# Patient Record
Sex: FEMALE | Race: WHITE | Employment: FULL TIME | URBAN - METROPOLITAN AREA
[De-identification: names, ages, dates, MRNs, and addresses within clinical notes are randomized per-mention and may not be internally consistent; named-entity substitution may affect disease eponyms.]

---

## 2017-08-10 ENCOUNTER — APPOINTMENT (OUTPATIENT)
Dept: ULTRASOUND IMAGING | Facility: HOSPITAL | Age: 57
DRG: 287 | End: 2017-08-10
Attending: INTERNAL MEDICINE
Payer: COMMERCIAL

## 2017-08-10 ENCOUNTER — HOSPITAL ENCOUNTER (INPATIENT)
Facility: HOSPITAL | Age: 57
LOS: 1 days | Discharge: HOME | DRG: 287 | End: 2017-08-11
Attending: INTERNAL MEDICINE | Admitting: INTERNAL MEDICINE
Payer: COMMERCIAL

## 2017-08-10 LAB
ALBUMIN SERPL-MCNC: 3.7 G/DL (ref 3.5–4.8)
ALP LIVER SERPL-CCNC: 69 U/L (ref 46–118)
ALT SERPL-CCNC: 33 U/L (ref 14–54)
APTT PPP: 33.3 SECONDS (ref 25–34)
AST SERPL-CCNC: 22 U/L (ref 15–41)
BASOPHILS # BLD AUTO: 0.03 X10(3) UL (ref 0–0.1)
BASOPHILS NFR BLD AUTO: 0.7 %
BETA NATRIURETIC PEPTIDE: 13 PG/ML (ref 2–99)
BILIRUB SERPL-MCNC: 0.4 MG/DL (ref 0.1–2)
BUN BLD-MCNC: 10 MG/DL (ref 8–20)
CALCIUM BLD-MCNC: 9.3 MG/DL (ref 8.3–10.3)
CHLORIDE: 106 MMOL/L (ref 101–111)
CK: 251 IU/L (ref 26–192)
CKMB: 4.3 NG/ML (ref 0–5)
CO2: 26 MMOL/L (ref 22–32)
CREAT BLD-MCNC: 0.81 MG/DL (ref 0.55–1.02)
EOSINOPHIL # BLD AUTO: 0.15 X10(3) UL (ref 0–0.3)
EOSINOPHIL NFR BLD AUTO: 3.6 %
ERYTHROCYTE [DISTWIDTH] IN BLOOD BY AUTOMATED COUNT: 13.5 % (ref 11.5–16)
GLUCOSE BLD-MCNC: 92 MG/DL (ref 70–99)
HAV IGM SER QL: 2.3 MG/DL (ref 1.7–3)
HCT VFR BLD AUTO: 38.3 % (ref 34–50)
HGB BLD-MCNC: 12.6 G/DL (ref 12–16)
IMMATURE GRANULOCYTE COUNT: 0.01 X10(3) UL (ref 0–1)
IMMATURE GRANULOCYTE RATIO %: 0.2 %
INR BLD: 1.02 (ref 0.89–1.11)
LYMPHOCYTES # BLD AUTO: 1.42 X10(3) UL (ref 0.9–4)
LYMPHOCYTES NFR BLD AUTO: 34.3 %
M PROTEIN MFR SERPL ELPH: 7.1 G/DL (ref 6.1–8.3)
MB INDEX: 2 % (ref ?–4)
MCH RBC QN AUTO: 28.6 PG (ref 27–33.2)
MCHC RBC AUTO-ENTMCNC: 32.9 G/DL (ref 31–37)
MCV RBC AUTO: 87 FL (ref 81–100)
MONOCYTES # BLD AUTO: 0.46 X10(3) UL (ref 0.1–0.6)
MONOCYTES NFR BLD AUTO: 11.1 %
NEUTROPHIL ABS PRELIM: 2.07 X10 (3) UL (ref 1.3–6.7)
NEUTROPHILS # BLD AUTO: 2.07 X10(3) UL (ref 1.3–6.7)
NEUTROPHILS NFR BLD AUTO: 50.1 %
PLATELET # BLD AUTO: 224 10(3)UL (ref 150–450)
POTASSIUM SERPL-SCNC: 3.9 MMOL/L (ref 3.6–5.1)
PSA SERPL DL<=0.01 NG/ML-MCNC: 13.4 SECONDS (ref 12–14.3)
RBC # BLD AUTO: 4.4 X10(6)UL (ref 3.8–5.1)
RED CELL DISTRIBUTION WIDTH-SD: 43.3 FL (ref 35.1–46.3)
SED RATE-ML: 10 MM/HR (ref 0–25)
SODIUM SERPL-SCNC: 139 MMOL/L (ref 136–144)
TROPONIN: <0.046 NG/ML (ref ?–0.05)
TSI SER-ACNC: 1.33 MIU/ML (ref 0.35–5.5)
WBC # BLD AUTO: 4.1 X10(3) UL (ref 4–13)

## 2017-08-10 PROCEDURE — 93923 UPR/LXTR ART STDY 3+ LVLS: CPT | Performed by: INTERNAL MEDICINE

## 2017-08-10 PROCEDURE — 99253 IP/OBS CNSLTJ NEW/EST LOW 45: CPT | Performed by: HOSPITALIST

## 2017-08-10 PROCEDURE — 93880 EXTRACRANIAL BILAT STUDY: CPT | Performed by: INTERNAL MEDICINE

## 2017-08-10 RX ORDER — DIPHENHYDRAMINE HYDROCHLORIDE 25 MG/1
1 TABLET ORAL DAILY
COMMUNITY

## 2017-08-10 RX ORDER — CLONAZEPAM 0.5 MG/1
1 TABLET ORAL NIGHTLY
Status: DISCONTINUED | OUTPATIENT
Start: 2017-08-10 | End: 2017-08-12

## 2017-08-10 RX ORDER — ASPIRIN 81 MG/1
324 TABLET, CHEWABLE ORAL ONCE
Status: DISCONTINUED | OUTPATIENT
Start: 2017-08-11 | End: 2017-08-11

## 2017-08-10 RX ORDER — MELATONIN
2500 DAILY
COMMUNITY

## 2017-08-10 RX ORDER — HEPARIN SODIUM 5000 [USP'U]/ML
5000 INJECTION, SOLUTION INTRAVENOUS; SUBCUTANEOUS EVERY 8 HOURS SCHEDULED
Status: DISCONTINUED | OUTPATIENT
Start: 2017-08-10 | End: 2017-08-12

## 2017-08-10 RX ORDER — SODIUM CHLORIDE 9 MG/ML
INJECTION, SOLUTION INTRAVENOUS CONTINUOUS
Status: DISCONTINUED | OUTPATIENT
Start: 2017-08-11 | End: 2017-08-11

## 2017-08-10 RX ORDER — DULOXETIN HYDROCHLORIDE 30 MG/1
120 CAPSULE, DELAYED RELEASE ORAL DAILY
Status: DISCONTINUED | OUTPATIENT
Start: 2017-08-11 | End: 2017-08-12

## 2017-08-10 RX ORDER — CARVEDILOL 3.12 MG/1
3.12 TABLET ORAL 2 TIMES DAILY WITH MEALS
Status: DISCONTINUED | OUTPATIENT
Start: 2017-08-10 | End: 2017-08-12

## 2017-08-10 RX ORDER — RANITIDINE 150 MG/1
150 CAPSULE ORAL DAILY
COMMUNITY
End: 2018-11-06 | Stop reason: ALTCHOICE

## 2017-08-10 RX ORDER — SODIUM CHLORIDE 9 MG/ML
INJECTION, SOLUTION INTRAVENOUS CONTINUOUS
Status: DISCONTINUED | OUTPATIENT
Start: 2017-08-10 | End: 2017-08-10

## 2017-08-10 RX ORDER — SODIUM CHLORIDE 9 MG/ML
INJECTION, SOLUTION INTRAVENOUS CONTINUOUS
Status: ACTIVE | OUTPATIENT
Start: 2017-08-10 | End: 2017-08-10

## 2017-08-10 RX ORDER — CLONAZEPAM 0.5 MG/1
0.25 TABLET ORAL DAILY
COMMUNITY

## 2017-08-10 RX ORDER — MELATONIN
400 DAILY
COMMUNITY

## 2017-08-10 RX ORDER — CLONAZEPAM 0.5 MG/1
0.5 TABLET ORAL DAILY
Status: DISCONTINUED | OUTPATIENT
Start: 2017-08-11 | End: 2017-08-12

## 2017-08-10 RX ORDER — RIBOFLAVIN (VITAMIN B2) 100 MG
250 TABLET ORAL DAILY
COMMUNITY

## 2017-08-10 RX ORDER — FAMOTIDINE 20 MG/1
40 TABLET ORAL DAILY
Status: DISCONTINUED | OUTPATIENT
Start: 2017-08-10 | End: 2017-08-12

## 2017-08-10 RX ORDER — TOPIRAMATE 100 MG/1
200 TABLET, FILM COATED ORAL NIGHTLY
Status: DISCONTINUED | OUTPATIENT
Start: 2017-08-10 | End: 2017-08-12

## 2017-08-10 RX ORDER — ASPIRIN 81 MG/1
324 TABLET, CHEWABLE ORAL ONCE
Status: DISCONTINUED | OUTPATIENT
Start: 2017-08-10 | End: 2017-08-10

## 2017-08-10 RX ORDER — CLONAZEPAM 1 MG/1
0.75 TABLET ORAL NIGHTLY
COMMUNITY

## 2017-08-10 NOTE — H&P
Please refer to office note from today. We are admitting Ms. Bond for progressive exertional chest pain with radiation to left shoulder , dyspnea and claudication in the setting of an abnormal stress echo with exercise induced wall motion abnormalities

## 2017-08-10 NOTE — CONSULTS
[address] Patient Status:  Inpatient    1960 MRN [id number]   [facility] 8NE-A Attending Sergio Russell MD   Hosp Day # 0 PCP No primary care provider on file.      Reason for consult: med mgt    Requ • Ovarian cyst     ovarian cysts   • Ovarian cystic mass    • Peptic ulcer disease    • PTSD (post-traumatic stress disorder)    • Pulmonary embolism (HCC)    • Raynaud's syndrome    • Sebaceous hyperplasia 12/1/2008   • Skin papules, generalized 12/1/2 nightly. For migraines  Disp:  Rfl:    Probiotic Product (PROBIOTIC OR) Take 1 tablet by mouth 2 (two) times daily.    Disp:  Rfl:    MAGNESIUM OR Take 2 tabs daily Disp:  Rfl:    cycloSPORINE (RESTASIS) 0.05 % Ophthalmic Emulsion 1 drop 2 (two) times daily 139   K  3.9   CL  106   CO2  26.0   ALKPHO  69   AST  22   ALT  33   BILT  0.4   TP  7.1       Recent Labs   Lab  08/10/17   1017   PTP  13.4   INR  1.02       Recent Labs   Lab  08/10/17   1017   TROP  <0.046   CK  251*       Imaging: Imaging data review

## 2017-08-10 NOTE — H&P
Liana Boston  : 1960  ACCOUNT:  [id number]  [phone]  PCP: Dr. Nataly Metcalf     TODAY'S DATE: 08/10/2017  DICTATED BY:  [00]    CHIEF COMPLAINT: Moreland Haritha pain.]    HPI:    [On 08/10/2017, Gume Valle, a 64year old female, presented with chest pa person and pressured speech. CV: PALP: PMI not displaced, no lifts and thrills or rub. AUSC:  regular rhythm, normal S1, S2 without S3; 1/6 systolic ejection murmur radiating to the left axilla.  CAROTIDS: carotid pulses normal. ABD AORTA: abdominal ao 03/09/10 Flonase               50MCG/AC  twice daily                              03/09/10 KlonoPIN              0.5MG     1 tablet three times daily               03/09/10 Lidoderm              5%        as needed                                01/29/

## 2017-08-11 ENCOUNTER — APPOINTMENT (OUTPATIENT)
Dept: INTERVENTIONAL RADIOLOGY/VASCULAR | Facility: HOSPITAL | Age: 57
DRG: 287 | End: 2017-08-11
Attending: INTERNAL MEDICINE
Payer: COMMERCIAL

## 2017-08-11 ENCOUNTER — APPOINTMENT (OUTPATIENT)
Dept: CV DIAGNOSTICS | Facility: HOSPITAL | Age: 57
DRG: 287 | End: 2017-08-11
Attending: INTERNAL MEDICINE
Payer: COMMERCIAL

## 2017-08-11 VITALS
DIASTOLIC BLOOD PRESSURE: 61 MMHG | BODY MASS INDEX: 22 KG/M2 | SYSTOLIC BLOOD PRESSURE: 100 MMHG | RESPIRATION RATE: 18 BRPM | HEART RATE: 67 BPM | TEMPERATURE: 98 F | OXYGEN SATURATION: 98 % | WEIGHT: 131.19 LBS

## 2017-08-11 PROBLEM — R07.9 CHEST PAIN: Status: ACTIVE | Noted: 2017-08-11

## 2017-08-11 LAB
ATRIAL RATE: 69 BPM
BASOPHILS # BLD AUTO: 0.02 X10(3) UL (ref 0–0.1)
BASOPHILS NFR BLD AUTO: 0.6 %
BUN BLD-MCNC: 11 MG/DL (ref 8–20)
CALCIUM BLD-MCNC: 8.8 MG/DL (ref 8.3–10.3)
CHLORIDE: 112 MMOL/L (ref 101–111)
CHOLEST SMN-MCNC: 229 MG/DL (ref ?–200)
CK: 182 IU/L (ref 26–192)
CO2: 23 MMOL/L (ref 22–32)
CREAT BLD-MCNC: 0.85 MG/DL (ref 0.55–1.02)
EOSINOPHIL # BLD AUTO: 0.13 X10(3) UL (ref 0–0.3)
EOSINOPHIL NFR BLD AUTO: 3.8 %
ERYTHROCYTE [DISTWIDTH] IN BLOOD BY AUTOMATED COUNT: 13.8 % (ref 11.5–16)
GLUCOSE BLD-MCNC: 103 MG/DL (ref 70–99)
HCT VFR BLD AUTO: 36.2 % (ref 34–50)
HDLC SERPL-MCNC: 49 MG/DL (ref 45–?)
HDLC SERPL: 4.67 {RATIO} (ref ?–4.44)
HGB BLD-MCNC: 11.9 G/DL (ref 12–16)
IMMATURE GRANULOCYTE COUNT: 0 X10(3) UL (ref 0–1)
IMMATURE GRANULOCYTE RATIO %: 0 %
LDLC SERPL CALC-MCNC: 147 MG/DL (ref ?–130)
LDLC SERPL-MCNC: 33 MG/DL (ref 5–40)
LYMPHOCYTES # BLD AUTO: 1.64 X10(3) UL (ref 0.9–4)
LYMPHOCYTES NFR BLD AUTO: 48.4 %
MCH RBC QN AUTO: 28.7 PG (ref 27–33.2)
MCHC RBC AUTO-ENTMCNC: 32.9 G/DL (ref 31–37)
MCV RBC AUTO: 87.2 FL (ref 81–100)
MONOCYTES # BLD AUTO: 0.36 X10(3) UL (ref 0.1–0.6)
MONOCYTES NFR BLD AUTO: 10.6 %
NEUTROPHIL ABS PRELIM: 1.24 X10 (3) UL (ref 1.3–6.7)
NEUTROPHILS # BLD AUTO: 1.24 X10(3) UL (ref 1.3–6.7)
NEUTROPHILS NFR BLD AUTO: 36.6 %
NONHDLC SERPL-MCNC: 180 MG/DL (ref ?–130)
P AXIS: 76 DEGREES
P-R INTERVAL: 164 MS
PLATELET # BLD AUTO: 208 10(3)UL (ref 150–450)
POTASSIUM SERPL-SCNC: 3.8 MMOL/L (ref 3.6–5.1)
Q-T INTERVAL: 440 MS
QRS DURATION: 84 MS
QTC CALCULATION (BEZET): 471 MS
R AXIS: 73 DEGREES
RBC # BLD AUTO: 4.15 X10(6)UL (ref 3.8–5.1)
RED CELL DISTRIBUTION WIDTH-SD: 43.9 FL (ref 35.1–46.3)
SODIUM SERPL-SCNC: 143 MMOL/L (ref 136–144)
T AXIS: 61 DEGREES
TRIGLYCERIDES: 164 MG/DL (ref ?–150)
TROPONIN: <0.046 NG/ML (ref ?–0.05)
VENTRICULAR RATE: 69 BPM
WBC # BLD AUTO: 3.4 X10(3) UL (ref 4–13)

## 2017-08-11 PROCEDURE — 93306 TTE W/DOPPLER COMPLETE: CPT | Performed by: INTERNAL MEDICINE

## 2017-08-11 PROCEDURE — 4A023N7 MEASUREMENT OF CARDIAC SAMPLING AND PRESSURE, LEFT HEART, PERCUTANEOUS APPROACH: ICD-10-PCS | Performed by: INTERNAL MEDICINE

## 2017-08-11 PROCEDURE — B2111ZZ FLUOROSCOPY OF MULTIPLE CORONARY ARTERIES USING LOW OSMOLAR CONTRAST: ICD-10-PCS | Performed by: INTERNAL MEDICINE

## 2017-08-11 PROCEDURE — 99232 SBSQ HOSP IP/OBS MODERATE 35: CPT | Performed by: HOSPITALIST

## 2017-08-11 RX ORDER — HEPARIN SODIUM 5000 [USP'U]/ML
INJECTION, SOLUTION INTRAVENOUS; SUBCUTANEOUS
Status: COMPLETED
Start: 2017-08-11 | End: 2017-08-11

## 2017-08-11 RX ORDER — MIDAZOLAM HYDROCHLORIDE 1 MG/ML
INJECTION INTRAMUSCULAR; INTRAVENOUS
Status: DISCONTINUED
Start: 2017-08-11 | End: 2017-08-11 | Stop reason: WASHOUT

## 2017-08-11 RX ORDER — SODIUM CHLORIDE 9 MG/ML
INJECTION, SOLUTION INTRAVENOUS CONTINUOUS
Status: ACTIVE | OUTPATIENT
Start: 2017-08-11 | End: 2017-08-11

## 2017-08-11 RX ORDER — LIDOCAINE HYDROCHLORIDE 10 MG/ML
INJECTION, SOLUTION INFILTRATION; PERINEURAL
Status: COMPLETED
Start: 2017-08-11 | End: 2017-08-11

## 2017-08-11 RX ORDER — MIDAZOLAM HYDROCHLORIDE 1 MG/ML
INJECTION INTRAMUSCULAR; INTRAVENOUS
Status: COMPLETED
Start: 2017-08-11 | End: 2017-08-11

## 2017-08-11 RX ORDER — SODIUM CHLORIDE 9 MG/ML
INJECTION, SOLUTION INTRAVENOUS CONTINUOUS
Status: DISCONTINUED | OUTPATIENT
Start: 2017-08-11 | End: 2017-08-12

## 2017-08-11 RX ORDER — ASPIRIN 81 MG/1
324 TABLET, CHEWABLE ORAL ONCE
Status: COMPLETED | OUTPATIENT
Start: 2017-08-11 | End: 2017-08-11

## 2017-08-11 RX ORDER — DIPHENHYDRAMINE HYDROCHLORIDE 50 MG/ML
INJECTION INTRAMUSCULAR; INTRAVENOUS
Status: COMPLETED
Start: 2017-08-11 | End: 2017-08-11

## 2017-08-11 RX ORDER — ASPIRIN 81 MG/1
TABLET, CHEWABLE ORAL
Status: DISCONTINUED
Start: 2017-08-11 | End: 2017-08-12

## 2017-08-11 RX ORDER — VERAPAMIL HYDROCHLORIDE 2.5 MG/ML
INJECTION, SOLUTION INTRAVENOUS
Status: COMPLETED
Start: 2017-08-11 | End: 2017-08-11

## 2017-08-11 NOTE — PROGRESS NOTES
Discussed with Dr. Eris Diop. Reviewed history and procedure with Eugenio Rodriguez. Discussed R/B/A - including no intervention -- appears to have a good understanding. Prelim cath    EDP 10    Normal coronary arteries.     TR band applied to right RA    Home tod

## 2017-08-11 NOTE — PROCEDURES
[facility]    PATIENT'S NAME: Martha De La Garza   ATTENDING PHYSICIAN: Mere Day. Hilary Ward M.D. OPERATING PHYSICIAN: Christopher Harris M.D.    PATIENT ACCOUNT#:   [de-identified]    LOCATION:  [address] [place] [address]  MEDICAL RECORD #:   [id number]       DATE OF BIRTH:  [birth year] end-diastolic pressure was 10 mmHg. CORONARY ARTERIOGRAPHY:  Coronary circulation was right dominant and no significant vascular calcification was seen fluoroscopically.   The coronary arteries were angiographically normal.    Dictated By Glendy Roe

## 2017-08-11 NOTE — PROGRESS NOTES
[address] Patient Status:  Inpatient    1960 MRN [id number]   [facility] 8NE-A Attending Antonella Polo MD   Hosp Day # 1 PCP No primary care provider on file.      Reason for consult: med mgt    Requ Anxiety-resume home meds  5.  Hx PE-not on AC any longer    Quality:  · DVT Prophylaxis: scds,heparin  · CODE status: full  · Padilla: no    Plan of care discussed with patient and rn    Kenia Person MD

## 2017-08-11 NOTE — PAYOR COMM NOTE
--------------  ADMISSION REVIEW     Payor: LEXI GARZA  Subscriber #:  [id number]  Authorization Number: N/A    Admit date: 8/10/17  Admit time: 6621       Admitting Physician: Edward Paz MD  Attending Physician:  Edward Paz MD  Primary Care Physicia Dose Route User    8/11/2017 0741 Given 40 mg Oral Leighann Ordonez, RN      Heparin Sodium (Porcine) 5000 UNIT/ML injection 5,000 Units     Date Action Dose Route User    8/10/2017 2107 Given 5000 Units Subcutaneous (Left Lower Abdomen) Ange Anguiano

## 2017-08-12 NOTE — PLAN OF CARE
ALERT AND ORIENTED X4 WITH RIGHT WRIST AYALA C/D/I. NO BLEEDING AND NO HEMATOMA. RADIAL PULSES PRESENT AND PALPABLE. DENIES ANY PAIN. DISCHARGE HOME ACCOMPANIED BY FAMILY MEMBERS WITH DISCHARGE INSTRUCTION GIVEN AND VERBALIZED UNDERSTANDING.

## 2017-08-17 NOTE — PAYOR COMM NOTE
--------------  DISCHARGE REVIEW    Payor: LEXI GARZA  Subscriber #:  [id number]  Authorization Number: N/A    Admit date: 8/10/17  Admit time:  0941  Discharge Date: 8/11/2017  8:25 PM     Admitting Physician: Adrián Queen MD  Attending Physician:  Gina saenz

## 2018-01-03 ENCOUNTER — HOSPITAL ENCOUNTER (OUTPATIENT)
Dept: CV DIAGNOSTICS | Facility: HOSPITAL | Age: 58
Discharge: HOME | End: 2018-01-03
Attending: INTERNAL MEDICINE
Payer: COMMERCIAL

## 2018-01-03 DIAGNOSIS — R00.2 PALPITATIONS: ICD-10-CM

## 2018-01-03 PROCEDURE — 93271 ECG/MONITORING AND ANALYSIS: CPT | Performed by: INTERNAL MEDICINE

## 2018-01-03 PROCEDURE — 93270 REMOTE 30 DAY ECG REV/REPORT: CPT | Performed by: INTERNAL MEDICINE

## 2018-01-03 PROCEDURE — 93272 ECG/REVIEW INTERPRET ONLY: CPT | Performed by: INTERNAL MEDICINE

## 2018-04-25 ENCOUNTER — HOSPITAL ENCOUNTER (OUTPATIENT)
Dept: GENERAL RADIOLOGY | Facility: HOSPITAL | Age: 58
Discharge: HOME | End: 2018-04-25
Attending: INTERNAL MEDICINE
Payer: COMMERCIAL

## 2018-04-25 DIAGNOSIS — R07.89 OTHER CHEST PAIN: ICD-10-CM

## 2018-04-25 PROCEDURE — 71046 X-RAY EXAM CHEST 2 VIEWS: CPT | Performed by: INTERNAL MEDICINE

## 2018-05-10 ENCOUNTER — RT VISIT (OUTPATIENT)
Dept: RESPIRATORY THERAPY | Facility: HOSPITAL | Age: 58
End: 2018-05-10
Attending: INTERNAL MEDICINE
Payer: COMMERCIAL

## 2018-05-10 DIAGNOSIS — R06.00 DYSPNEA: ICD-10-CM

## 2018-05-10 PROCEDURE — 94070 EVALUATION OF WHEEZING: CPT

## 2018-05-15 NOTE — PROCEDURES
Methacholine challenge testing was done. Spirometry at baseline shows no evidence of airway obstruction. After exposure to methacholine  there was no significant decline in the FEV1.    This is a negative  methacholine challenge test and not  indicative

## 2018-06-06 ENCOUNTER — HOSPITAL ENCOUNTER (OUTPATIENT)
Dept: GENERAL RADIOLOGY | Age: 58
Discharge: HOME | End: 2018-06-06
Attending: INTERNAL MEDICINE
Payer: COMMERCIAL

## 2018-06-06 DIAGNOSIS — R91.8 PULMONARY NODULES: ICD-10-CM

## 2018-06-06 PROCEDURE — 71046 X-RAY EXAM CHEST 2 VIEWS: CPT | Performed by: INTERNAL MEDICINE

## 2018-07-02 ENCOUNTER — HOSPITAL ENCOUNTER (OUTPATIENT)
Dept: CT IMAGING | Age: 58
Discharge: HOME | End: 2018-07-02
Attending: INTERNAL MEDICINE
Payer: COMMERCIAL

## 2018-07-02 DIAGNOSIS — R07.81 RIB PAIN ON LEFT SIDE: ICD-10-CM

## 2018-07-02 DIAGNOSIS — R07.9 CHEST PAIN: ICD-10-CM

## 2018-07-02 LAB — CREAT SERPL-MCNC: 0.8 MG/DL (ref 0.55–1.02)

## 2018-07-02 PROCEDURE — 82565 ASSAY OF CREATININE: CPT

## 2018-07-02 PROCEDURE — 71260 CT THORAX DX C+: CPT | Performed by: INTERNAL MEDICINE

## 2018-08-15 ENCOUNTER — HOSPITAL ENCOUNTER (OUTPATIENT)
Dept: CT IMAGING | Age: 58
Discharge: HOME | End: 2018-08-15
Attending: INTERNAL MEDICINE
Payer: COMMERCIAL

## 2018-08-15 DIAGNOSIS — J18.9 PNEUMONIA: ICD-10-CM

## 2018-08-15 PROCEDURE — 71250 CT THORAX DX C-: CPT | Performed by: INTERNAL MEDICINE

## 2018-09-04 ENCOUNTER — RT VISIT (OUTPATIENT)
Dept: RESPIRATORY THERAPY | Facility: HOSPITAL | Age: 58
End: 2018-09-04
Attending: INTERNAL MEDICINE
Payer: COMMERCIAL

## 2018-09-04 DIAGNOSIS — R06.02 SHORTNESS OF BREATH: ICD-10-CM

## 2018-09-05 NOTE — PROCEDURES
Spirometry, flow volume loop, lung volumes are all within normal limits. No evidence of restriction or obstruction.    There is a moderate increase in the RV and mild in  TLC  suggesting  air trapping  and hyperinflation  (   possible due to  intermitten

## 2018-09-17 ENCOUNTER — HOSPITAL ENCOUNTER (OUTPATIENT)
Dept: CT IMAGING | Age: 58
Discharge: HOME | End: 2018-09-17
Attending: INTERNAL MEDICINE
Payer: COMMERCIAL

## 2018-09-17 DIAGNOSIS — R91.8 PULMONARY NODULES/LESIONS, MULTIPLE: ICD-10-CM

## 2018-09-17 PROCEDURE — 71250 CT THORAX DX C-: CPT | Performed by: INTERNAL MEDICINE

## 2018-10-30 ENCOUNTER — HOSPITAL ENCOUNTER (OUTPATIENT)
Dept: NUCLEAR MEDICINE | Facility: HOSPITAL | Age: 58
Discharge: HOME | End: 2018-10-30
Attending: INTERNAL MEDICINE
Payer: COMMERCIAL

## 2018-10-30 DIAGNOSIS — R91.8 PULMONARY NODULES: ICD-10-CM

## 2018-10-30 PROCEDURE — 82962 GLUCOSE BLOOD TEST: CPT

## 2018-10-30 PROCEDURE — 78815 PET IMAGE W/CT SKULL-THIGH: CPT | Performed by: INTERNAL MEDICINE

## 2018-11-06 VITALS — BODY MASS INDEX: 22.16 KG/M2 | HEIGHT: 65 IN | WEIGHT: 133 LBS

## 2018-11-06 RX ORDER — CITALOPRAM 20 MG/1
20 TABLET ORAL DAILY
COMMUNITY

## 2018-11-06 RX ORDER — PANTOPRAZOLE SODIUM 20 MG/1
20 TABLET, DELAYED RELEASE ORAL
COMMUNITY

## 2018-11-13 ENCOUNTER — HOSPITAL ENCOUNTER (OUTPATIENT)
Dept: CT IMAGING | Facility: HOSPITAL | Age: 58
Discharge: HOME | End: 2018-11-13
Attending: INTERNAL MEDICINE
Payer: COMMERCIAL

## 2018-11-19 ENCOUNTER — APPOINTMENT (OUTPATIENT)
Dept: LAB | Facility: HOSPITAL | Age: 58
End: 2018-11-19
Attending: INTERNAL MEDICINE
Payer: COMMERCIAL

## 2018-11-19 ENCOUNTER — HOSPITAL ENCOUNTER (OUTPATIENT)
Dept: GENERAL RADIOLOGY | Facility: HOSPITAL | Age: 58
Discharge: HOME | End: 2018-11-19
Attending: RADIOLOGY
Payer: COMMERCIAL

## 2018-11-19 ENCOUNTER — HOSPITAL ENCOUNTER (OUTPATIENT)
Dept: CT IMAGING | Facility: HOSPITAL | Age: 58
Discharge: HOME | End: 2018-11-19
Attending: INTERNAL MEDICINE
Payer: COMMERCIAL

## 2018-11-19 VITALS
SYSTOLIC BLOOD PRESSURE: 104 MMHG | HEIGHT: 64 IN | TEMPERATURE: 98 F | RESPIRATION RATE: 16 BRPM | HEART RATE: 76 BPM | BODY MASS INDEX: 23.9 KG/M2 | DIASTOLIC BLOOD PRESSURE: 72 MMHG | OXYGEN SATURATION: 100 % | WEIGHT: 140 LBS

## 2018-11-19 DIAGNOSIS — R22.2 CHEST MASS: Primary | ICD-10-CM

## 2018-11-19 DIAGNOSIS — R22.2 CHEST MASS: ICD-10-CM

## 2018-11-19 DIAGNOSIS — R91.8 MASS OF LUNG: ICD-10-CM

## 2018-11-19 PROCEDURE — 87116 MYCOBACTERIA CULTURE: CPT | Performed by: INTERNAL MEDICINE

## 2018-11-19 PROCEDURE — 87102 FUNGUS ISOLATION CULTURE: CPT | Performed by: INTERNAL MEDICINE

## 2018-11-19 PROCEDURE — 87070 CULTURE OTHR SPECIMN AEROBIC: CPT | Performed by: INTERNAL MEDICINE

## 2018-11-19 PROCEDURE — 87205 SMEAR GRAM STAIN: CPT | Performed by: INTERNAL MEDICINE

## 2018-11-19 PROCEDURE — 85610 PROTHROMBIN TIME: CPT

## 2018-11-19 PROCEDURE — 36415 COLL VENOUS BLD VENIPUNCTURE: CPT

## 2018-11-19 PROCEDURE — 88305 TISSUE EXAM BY PATHOLOGIST: CPT | Performed by: INTERNAL MEDICINE

## 2018-11-19 PROCEDURE — 87176 TISSUE HOMOGENIZATION CULTR: CPT | Performed by: INTERNAL MEDICINE

## 2018-11-19 PROCEDURE — 77012 CT SCAN FOR NEEDLE BIOPSY: CPT | Performed by: INTERNAL MEDICINE

## 2018-11-19 PROCEDURE — 88312 SPECIAL STAINS GROUP 1: CPT | Performed by: INTERNAL MEDICINE

## 2018-11-19 PROCEDURE — 87075 CULTR BACTERIA EXCEPT BLOOD: CPT | Performed by: INTERNAL MEDICINE

## 2018-11-19 PROCEDURE — 87206 SMEAR FLUORESCENT/ACID STAI: CPT | Performed by: INTERNAL MEDICINE

## 2018-11-19 PROCEDURE — 85027 COMPLETE CBC AUTOMATED: CPT

## 2018-11-19 PROCEDURE — 32405 CT BIOPSY LUNG OR MEDIASTINUM (CPT=77012/32405): CPT | Performed by: INTERNAL MEDICINE

## 2018-11-19 PROCEDURE — 99153 MOD SED SAME PHYS/QHP EA: CPT | Performed by: INTERNAL MEDICINE

## 2018-11-19 PROCEDURE — 99152 MOD SED SAME PHYS/QHP 5/>YRS: CPT | Performed by: INTERNAL MEDICINE

## 2018-11-19 PROCEDURE — 71045 X-RAY EXAM CHEST 1 VIEW: CPT | Performed by: RADIOLOGY

## 2018-11-19 RX ORDER — HYDROCODONE BITARTRATE AND ACETAMINOPHEN 5; 325 MG/1; MG/1
1 TABLET ORAL EVERY 4 HOURS PRN
Status: DISCONTINUED | OUTPATIENT
Start: 2018-11-19 | End: 2018-11-21

## 2018-11-19 RX ORDER — MORPHINE SULFATE 2 MG/ML
2 INJECTION, SOLUTION INTRAMUSCULAR; INTRAVENOUS EVERY 2 HOUR PRN
Status: DISCONTINUED | OUTPATIENT
Start: 2018-11-19 | End: 2018-11-21

## 2018-11-19 RX ORDER — MIDAZOLAM HYDROCHLORIDE 1 MG/ML
INJECTION INTRAMUSCULAR; INTRAVENOUS
Status: COMPLETED
Start: 2018-11-19 | End: 2018-11-19

## 2018-11-19 RX ORDER — FLUMAZENIL 0.1 MG/ML
0.2 INJECTION, SOLUTION INTRAVENOUS AS NEEDED
Status: DISCONTINUED | OUTPATIENT
Start: 2018-11-19 | End: 2018-11-21

## 2018-11-19 RX ORDER — HYDROCODONE BITARTRATE AND ACETAMINOPHEN 5; 325 MG/1; MG/1
2 TABLET ORAL EVERY 4 HOURS PRN
Status: DISCONTINUED | OUTPATIENT
Start: 2018-11-19 | End: 2018-11-21

## 2018-11-19 RX ORDER — ACETAMINOPHEN 325 MG/1
650 TABLET ORAL EVERY 6 HOURS PRN
Status: DISCONTINUED | OUTPATIENT
Start: 2018-11-19 | End: 2018-11-21

## 2018-11-19 RX ORDER — SODIUM CHLORIDE 9 MG/ML
INJECTION, SOLUTION INTRAVENOUS CONTINUOUS
Status: DISCONTINUED | OUTPATIENT
Start: 2018-11-19 | End: 2018-11-21

## 2018-11-19 RX ORDER — MIDAZOLAM HYDROCHLORIDE 1 MG/ML
1 INJECTION INTRAMUSCULAR; INTRAVENOUS EVERY 5 MIN PRN
Status: DISCONTINUED | OUTPATIENT
Start: 2018-11-19 | End: 2018-11-21

## 2018-11-19 RX ORDER — NALOXONE HYDROCHLORIDE 0.4 MG/ML
80 INJECTION, SOLUTION INTRAMUSCULAR; INTRAVENOUS; SUBCUTANEOUS AS NEEDED
Status: DISCONTINUED | OUTPATIENT
Start: 2018-11-19 | End: 2018-11-21

## 2018-11-19 RX ADMIN — MIDAZOLAM HYDROCHLORIDE 1 MG: 1 INJECTION INTRAMUSCULAR; INTRAVENOUS at 09:40:00

## 2018-11-19 RX ADMIN — SODIUM CHLORIDE 200 ML: 9 INJECTION, SOLUTION INTRAVENOUS at 08:30:00

## 2018-11-19 RX ADMIN — MIDAZOLAM HYDROCHLORIDE 2 MG: 1 INJECTION INTRAMUSCULAR; INTRAVENOUS at 09:30:00

## 2018-11-19 RX ADMIN — MIDAZOLAM HYDROCHLORIDE 1 MG: 1 INJECTION INTRAMUSCULAR; INTRAVENOUS at 09:50:00

## 2018-11-19 NOTE — PROCEDURES
[facility]  Procedure Note    [address] [facility] Patient Status:  Outpatient    1960 MRN [id number]   [facility] CT Attending Yolanda Lubin MD   Hosp Day # 0 PCP CHRISTINE RUTH     Procedure: right lung biopsy by CT    Pre-Procedure Edelmira

## 2018-12-03 ENCOUNTER — APPOINTMENT (OUTPATIENT)
Dept: LAB | Age: 58
End: 2018-12-03
Attending: INTERNAL MEDICINE
Payer: COMMERCIAL

## 2018-12-03 DIAGNOSIS — L92.9 RESTRICTIVE CARDIOMYOPATHY SECONDARY TO GRANULOMAS (HCC): ICD-10-CM

## 2018-12-03 DIAGNOSIS — I42.5 RESTRICTIVE CARDIOMYOPATHY SECONDARY TO GRANULOMAS (HCC): ICD-10-CM

## 2018-12-03 PROCEDURE — 87385 HISTOPLASMA CAPSUL AG IA: CPT

## 2019-02-04 ENCOUNTER — HOSPITAL ENCOUNTER (OUTPATIENT)
Dept: CT IMAGING | Age: 59
Discharge: HOME | End: 2019-02-04
Attending: INTERNAL MEDICINE
Payer: COMMERCIAL

## 2019-02-04 DIAGNOSIS — R91.8 LUNG MASS: ICD-10-CM

## 2019-02-04 PROCEDURE — 71250 CT THORAX DX C-: CPT | Performed by: INTERNAL MEDICINE

## 2019-11-03 ENCOUNTER — HOSPITAL ENCOUNTER (OUTPATIENT)
Dept: CT IMAGING | Age: 59
Discharge: HOME | End: 2019-11-03
Attending: INTERNAL MEDICINE
Payer: COMMERCIAL

## 2019-11-03 DIAGNOSIS — J84.10 MULTIPLE HYALINIZING GRANULOMAS OF LUNG (HCC): ICD-10-CM

## 2019-11-03 PROCEDURE — 71250 CT THORAX DX C-: CPT | Performed by: INTERNAL MEDICINE

## 2019-11-12 ENCOUNTER — LAB ENCOUNTER (OUTPATIENT)
Dept: LAB | Age: 59
End: 2019-11-12
Attending: INTERNAL MEDICINE
Payer: COMMERCIAL

## 2019-11-12 DIAGNOSIS — R91.8 PULMONARY INFILTRATES: Primary | ICD-10-CM

## 2019-11-12 DIAGNOSIS — R91.8 PULMONARY INFILTRATES: ICD-10-CM

## 2019-11-12 PROCEDURE — 82164 ANGIOTENSIN I ENZYME TEST: CPT

## 2019-11-12 PROCEDURE — 86003 ALLG SPEC IGE CRUDE XTRC EA: CPT

## 2019-11-12 PROCEDURE — 86606 ASPERGILLUS ANTIBODY: CPT

## 2019-11-12 PROCEDURE — 36415 COLL VENOUS BLD VENIPUNCTURE: CPT

## 2019-11-12 PROCEDURE — 86331 IMMUNODIFFUSION OUCHTERLONY: CPT

## 2019-11-12 PROCEDURE — 85025 COMPLETE CBC W/AUTO DIFF WBC: CPT

## 2019-12-17 ENCOUNTER — HOSPITAL ENCOUNTER (OUTPATIENT)
Dept: GENERAL RADIOLOGY | Age: 59
Discharge: HOME | End: 2019-12-17
Attending: INTERNAL MEDICINE
Payer: COMMERCIAL

## 2019-12-17 DIAGNOSIS — R05.9 COUGH: ICD-10-CM

## 2019-12-17 PROCEDURE — 71046 X-RAY EXAM CHEST 2 VIEWS: CPT | Performed by: INTERNAL MEDICINE

## 2019-12-27 ENCOUNTER — LAB ENCOUNTER (OUTPATIENT)
Dept: LAB | Age: 59
End: 2019-12-27
Attending: INTERNAL MEDICINE
Payer: COMMERCIAL

## 2019-12-27 DIAGNOSIS — L92.9 EXCESSIVE GRANULATION: Primary | ICD-10-CM

## 2019-12-27 PROCEDURE — 82164 ANGIOTENSIN I ENZYME TEST: CPT

## 2019-12-27 PROCEDURE — 85652 RBC SED RATE AUTOMATED: CPT

## 2019-12-27 PROCEDURE — 36415 COLL VENOUS BLD VENIPUNCTURE: CPT

## 2019-12-27 PROCEDURE — 86255 FLUORESCENT ANTIBODY SCREEN: CPT

## 2019-12-27 PROCEDURE — 83516 IMMUNOASSAY NONANTIBODY: CPT

## 2019-12-27 PROCEDURE — 83876 ASSAY MYELOPEROXIDASE: CPT

## 2020-02-10 ENCOUNTER — HOSPITAL ENCOUNTER (EMERGENCY)
Facility: HOSPITAL | Age: 60
Discharge: HOME | End: 2020-02-10
Attending: EMERGENCY MEDICINE
Payer: COMMERCIAL

## 2020-02-10 ENCOUNTER — HOSPITAL ENCOUNTER (OUTPATIENT)
Dept: CT IMAGING | Facility: HOSPITAL | Age: 60
Discharge: HOME | End: 2020-02-10
Attending: INTERNAL MEDICINE
Payer: COMMERCIAL

## 2020-02-10 ENCOUNTER — APPOINTMENT (OUTPATIENT)
Dept: GENERAL RADIOLOGY | Facility: HOSPITAL | Age: 60
End: 2020-02-10
Attending: EMERGENCY MEDICINE
Payer: COMMERCIAL

## 2020-02-10 VITALS
HEART RATE: 79 BPM | SYSTOLIC BLOOD PRESSURE: 99 MMHG | HEIGHT: 65 IN | BODY MASS INDEX: 23.49 KG/M2 | WEIGHT: 141 LBS | DIASTOLIC BLOOD PRESSURE: 57 MMHG | RESPIRATION RATE: 17 BRPM | OXYGEN SATURATION: 100 % | TEMPERATURE: 97 F

## 2020-02-10 DIAGNOSIS — R07.1 CHEST PAIN ON BREATHING: ICD-10-CM

## 2020-02-10 DIAGNOSIS — M54.9 BACK PAIN WITH RADIATION: Primary | ICD-10-CM

## 2020-02-10 LAB
ALBUMIN SERPL-MCNC: 3.7 G/DL (ref 3.4–5)
ALBUMIN/GLOB SERPL: 1 {RATIO} (ref 1–2)
ALP LIVER SERPL-CCNC: 76 U/L (ref 46–118)
ALT SERPL-CCNC: 29 U/L (ref 13–56)
ANION GAP SERPL CALC-SCNC: 4 MMOL/L (ref 0–18)
APTT PPP: 32.4 SECONDS (ref 25.4–36.1)
AST SERPL-CCNC: 23 U/L (ref 15–37)
BASOPHILS # BLD AUTO: 0.03 X10(3) UL (ref 0–0.2)
BASOPHILS NFR BLD AUTO: 0.6 %
BILIRUB SERPL-MCNC: 0.5 MG/DL (ref 0.1–2)
BILIRUB UR QL STRIP.AUTO: NEGATIVE
BUN BLD-MCNC: 9 MG/DL (ref 7–18)
BUN/CREAT SERPL: 13.8 (ref 10–20)
CALCIUM BLD-MCNC: 9.2 MG/DL (ref 8.5–10.1)
CHLORIDE SERPL-SCNC: 108 MMOL/L (ref 98–112)
CLARITY UR REFRACT.AUTO: CLEAR
CO2 SERPL-SCNC: 24 MMOL/L (ref 21–32)
CREAT BLD-MCNC: 0.65 MG/DL (ref 0.55–1.02)
CREAT BLD-MCNC: 0.7 MG/DL (ref 0.55–1.02)
D-DIMER: 0.36 UG/ML FEU (ref ?–0.59)
DEPRECATED RDW RBC AUTO: 41.8 FL (ref 35.1–46.3)
EOSINOPHIL # BLD AUTO: 0.24 X10(3) UL (ref 0–0.7)
EOSINOPHIL NFR BLD AUTO: 4.8 %
ERYTHROCYTE [DISTWIDTH] IN BLOOD BY AUTOMATED COUNT: 12.8 % (ref 11–15)
GLOBULIN PLAS-MCNC: 3.7 G/DL (ref 2.8–4.4)
GLUCOSE BLD-MCNC: 82 MG/DL (ref 70–99)
GLUCOSE UR STRIP.AUTO-MCNC: NEGATIVE MG/DL
HCT VFR BLD AUTO: 38.4 % (ref 35–48)
HGB BLD-MCNC: 12.6 G/DL (ref 12–16)
IMM GRANULOCYTES # BLD AUTO: 0.01 X10(3) UL (ref 0–1)
IMM GRANULOCYTES NFR BLD: 0.2 %
INR BLD: 1.01 (ref 0.9–1.1)
KETONES UR STRIP.AUTO-MCNC: NEGATIVE MG/DL
LIPASE SERPL-CCNC: 64 U/L (ref 73–393)
LYMPHOCYTES # BLD AUTO: 2.06 X10(3) UL (ref 1–4)
LYMPHOCYTES NFR BLD AUTO: 40.9 %
M PROTEIN MFR SERPL ELPH: 7.4 G/DL (ref 6.4–8.2)
MCH RBC QN AUTO: 29.1 PG (ref 26–34)
MCHC RBC AUTO-ENTMCNC: 32.8 G/DL (ref 31–37)
MCV RBC AUTO: 88.7 FL (ref 80–100)
MONOCYTES # BLD AUTO: 0.34 X10(3) UL (ref 0.1–1)
MONOCYTES NFR BLD AUTO: 6.7 %
NEUTROPHILS # BLD AUTO: 2.36 X10 (3) UL (ref 1.5–7.7)
NEUTROPHILS # BLD AUTO: 2.36 X10(3) UL (ref 1.5–7.7)
NEUTROPHILS NFR BLD AUTO: 46.8 %
NITRITE UR QL STRIP.AUTO: NEGATIVE
OSMOLALITY SERPL CALC.SUM OF ELEC: 280 MOSM/KG (ref 275–295)
PH UR STRIP.AUTO: 7 [PH] (ref 4.5–8)
PLATELET # BLD AUTO: 296 10(3)UL (ref 150–450)
POTASSIUM SERPL-SCNC: 3.6 MMOL/L (ref 3.5–5.1)
PROT UR STRIP.AUTO-MCNC: NEGATIVE MG/DL
PSA SERPL DL<=0.01 NG/ML-MCNC: 13.7 SECONDS (ref 12.5–14.7)
RBC # BLD AUTO: 4.33 X10(6)UL (ref 3.8–5.3)
RBC UR QL AUTO: NEGATIVE
SODIUM SERPL-SCNC: 136 MMOL/L (ref 136–145)
SP GR UR STRIP.AUTO: 1.01 (ref 1–1.03)
TROPONIN I SERPL-MCNC: <0.045 NG/ML (ref ?–0.04)
UROBILINOGEN UR STRIP.AUTO-MCNC: <2 MG/DL
WBC # BLD AUTO: 5 X10(3) UL (ref 4–11)

## 2020-02-10 PROCEDURE — 85730 THROMBOPLASTIN TIME PARTIAL: CPT | Performed by: EMERGENCY MEDICINE

## 2020-02-10 PROCEDURE — 85379 FIBRIN DEGRADATION QUANT: CPT | Performed by: EMERGENCY MEDICINE

## 2020-02-10 PROCEDURE — 93010 ELECTROCARDIOGRAM REPORT: CPT

## 2020-02-10 PROCEDURE — 81001 URINALYSIS AUTO W/SCOPE: CPT | Performed by: EMERGENCY MEDICINE

## 2020-02-10 PROCEDURE — 82565 ASSAY OF CREATININE: CPT

## 2020-02-10 PROCEDURE — 99284 EMERGENCY DEPT VISIT MOD MDM: CPT

## 2020-02-10 PROCEDURE — 85025 COMPLETE CBC W/AUTO DIFF WBC: CPT | Performed by: EMERGENCY MEDICINE

## 2020-02-10 PROCEDURE — 84484 ASSAY OF TROPONIN QUANT: CPT | Performed by: EMERGENCY MEDICINE

## 2020-02-10 PROCEDURE — 83690 ASSAY OF LIPASE: CPT | Performed by: EMERGENCY MEDICINE

## 2020-02-10 PROCEDURE — 71275 CT ANGIOGRAPHY CHEST: CPT | Performed by: INTERNAL MEDICINE

## 2020-02-10 PROCEDURE — 85610 PROTHROMBIN TIME: CPT | Performed by: EMERGENCY MEDICINE

## 2020-02-10 PROCEDURE — 96374 THER/PROPH/DIAG INJ IV PUSH: CPT

## 2020-02-10 PROCEDURE — 80053 COMPREHEN METABOLIC PANEL: CPT | Performed by: EMERGENCY MEDICINE

## 2020-02-10 PROCEDURE — 71045 X-RAY EXAM CHEST 1 VIEW: CPT | Performed by: EMERGENCY MEDICINE

## 2020-02-10 PROCEDURE — 93005 ELECTROCARDIOGRAM TRACING: CPT

## 2020-02-10 PROCEDURE — 96361 HYDRATE IV INFUSION ADD-ON: CPT

## 2020-02-10 PROCEDURE — 99285 EMERGENCY DEPT VISIT HI MDM: CPT

## 2020-02-10 RX ORDER — SODIUM CHLORIDE 9 MG/ML
INJECTION, SOLUTION INTRAVENOUS CONTINUOUS
Status: DISCONTINUED | OUTPATIENT
Start: 2020-02-10 | End: 2020-02-10

## 2020-02-10 RX ORDER — HYDROCODONE BITARTRATE AND ACETAMINOPHEN 5; 325 MG/1; MG/1
1-2 TABLET ORAL EVERY 6 HOURS PRN
Qty: 10 TABLET | Refills: 0 | Status: SHIPPED | OUTPATIENT
Start: 2020-02-10 | End: 2020-02-17

## 2020-02-10 RX ORDER — KETOROLAC TROMETHAMINE 30 MG/ML
15 INJECTION, SOLUTION INTRAMUSCULAR; INTRAVENOUS ONCE
Status: COMPLETED | OUTPATIENT
Start: 2020-02-10 | End: 2020-02-10

## 2020-02-11 LAB
ATRIAL RATE: 71 BPM
P AXIS: 77 DEGREES
P-R INTERVAL: 150 MS
Q-T INTERVAL: 440 MS
QRS DURATION: 90 MS
QTC CALCULATION (BEZET): 478 MS
R AXIS: 69 DEGREES
T AXIS: 61 DEGREES
VENTRICULAR RATE: 71 BPM

## 2020-02-11 NOTE — ED INITIAL ASSESSMENT (HPI)
Patient having sharp middle back pain to middle of chest, causing chest pressure. Pain down left arm and left shoulder blade. This all started last night. No vomiting. Patient thought acid reflux, but her MD advised her to come here for further evaluation.

## 2020-02-11 NOTE — ED PROVIDER NOTES
Patient Seen in: [facility] Emergency Department      History   Patient presents with:  Back Pain    Stated Complaint: back pain    HPI    The patient is a pleasant 80-year-old female presenting to emerge part for back pain.   This is been a progress endoscopy today   • Lentigines 12/1/2008   • Low back pain radiating to both legs 11/2008    x 15 years, L greater than R, goes to teet   • Lumbar disc herniation 3/13/2008    L2-3, L3-4 w/moderate central canal stenosis   • Malnutrition ([address] 2001    fred 17   Ht 165.1 cm (5' 5\")   Wt 64 kg   SpO2 100%   BMI 23.46 kg/m²         Physical Exam    Alert and oriented patient appears no distress HEENT exam is normal lungs are clear cardiovascular exam shows regular rhythm abdomen soft nontender extremity cyanos the patient's pain is more musculoskeletal patient will be discharged home is return emerge department she symptoms other complaints              Disposition and Plan     Clinical Impression:  Back pain with radiation  (primary encounter diagnosis)    Disp

## 2020-09-13 ENCOUNTER — HOSPITAL ENCOUNTER (OUTPATIENT)
Dept: CT IMAGING | Age: 60
Discharge: HOME | End: 2020-09-13
Attending: INTERNAL MEDICINE
Payer: COMMERCIAL

## 2020-09-13 DIAGNOSIS — R91.8 PULMONARY NODULES: ICD-10-CM

## 2020-09-13 PROCEDURE — 71250 CT THORAX DX C-: CPT | Performed by: INTERNAL MEDICINE

## 2021-05-02 ENCOUNTER — HOSPITAL ENCOUNTER (OUTPATIENT)
Dept: CT IMAGING | Age: 61
Discharge: HOME | End: 2021-05-02
Attending: INTERNAL MEDICINE
Payer: COMMERCIAL

## 2021-05-02 DIAGNOSIS — R91.8 MULTIPLE PULMONARY NODULES: ICD-10-CM

## 2021-05-02 PROCEDURE — 71250 CT THORAX DX C-: CPT | Performed by: INTERNAL MEDICINE

## 2022-12-03 ENCOUNTER — HOSPITAL ENCOUNTER (OUTPATIENT)
Dept: CT IMAGING | Age: 62
Discharge: HOME | End: 2022-12-03
Attending: INTERNAL MEDICINE
Payer: COMMERCIAL

## 2022-12-03 DIAGNOSIS — L92.9 GRANULOMA, SKIN: ICD-10-CM

## 2022-12-03 PROCEDURE — 71250 CT THORAX DX C-: CPT | Performed by: INTERNAL MEDICINE

## 2022-12-14 ENCOUNTER — TELEMEDICINE (OUTPATIENT)
Facility: CLINIC | Age: 62
End: 2022-12-14
Payer: COMMERCIAL

## 2022-12-14 DIAGNOSIS — R91.8 PULMONARY INFILTRATES: Primary | ICD-10-CM

## 2023-01-13 ENCOUNTER — TELEPHONE (OUTPATIENT)
Facility: CLINIC | Age: 63
End: 2023-01-13

## 2023-01-18 ENCOUNTER — TELEPHONE (OUTPATIENT)
Facility: CLINIC | Age: 63
End: 2023-01-18

## 2023-01-18 NOTE — TELEPHONE ENCOUNTER
Per Dr. Effie Ramirez, pt notified that there were no issues with her blood work and she will review with pt at her next appt. Pt transferred to  to schedule.

## 2023-03-14 ENCOUNTER — TELEPHONE (OUTPATIENT)
Facility: CLINIC | Age: 63
End: 2023-03-14

## 2023-03-14 NOTE — TELEPHONE ENCOUNTER
Pt had CXR & labs done with Dr. Lauren Sen [facility] in [facility]). CXR showed pneumonia. He would like Dr. Amato Cea to look it over because pt also has nodules. Pt is having the results faxed over to our office for Dr. Lima Salmon to review.

## 2023-03-15 ENCOUNTER — PATIENT MESSAGE (OUTPATIENT)
Facility: CLINIC | Age: 63
End: 2023-03-15

## 2023-03-15 NOTE — TELEPHONE ENCOUNTER
From: Merry Bond  To:  Tracee Ortiz MD  Sent: 3/15/2023 3:06 PM CDT  Subject: Delmis Young Ronda    Chest X-ray results of 3-

## 2023-03-15 NOTE — TELEPHONE ENCOUNTER
Received results via fax from [facility]. Addressed pt's questions and concerns in previous encounter. Pt advised to go to ER.

## 2023-03-15 NOTE — TELEPHONE ENCOUNTER
Pt called to see if we received results from [facility]. Not found anywhere. Pt given fax number and will call again and ask to send to [facility] Finally received both labs and CXR results. Pt states that she was given Levofloxacin 500mg X 10 days. Referred back to Dr. Lorin Polo by Dr. Monica Grace for pulmonary nodules (pt believes). .  Pt reports that she has been having chest pressure/pain that radiates to back since before CXR and this pain continues. Has very little cough - no mucus. Covid home test was negative. Last night she developed pain in back of her head which caused her to vomit. Also reports tingling in hands and feet. States she almost called daughter last night to go to ER due to pain and vomiting. Also is dizzy upon standing. Discussed with Dr. Lorin Polo. Per Dr. Lorin Polo, for the pneumonia that is reported in CXR, pt should continue the Levofloxacin. For the pain in back of head, pt advised to go to ER for evaluation. Pt states understanding.

## 2023-03-31 ENCOUNTER — OFFICE VISIT (OUTPATIENT)
Facility: CLINIC | Age: 63
End: 2023-03-31
Payer: COMMERCIAL

## 2023-03-31 VITALS
SYSTOLIC BLOOD PRESSURE: 120 MMHG | RESPIRATION RATE: 16 BRPM | DIASTOLIC BLOOD PRESSURE: 80 MMHG | HEART RATE: 70 BPM | WEIGHT: 146 LBS | BODY MASS INDEX: 24.32 KG/M2 | HEIGHT: 65 IN | OXYGEN SATURATION: 98 %

## 2023-03-31 DIAGNOSIS — R91.8 PULMONARY INFILTRATE: Primary | ICD-10-CM

## 2023-03-31 PROCEDURE — 99214 OFFICE O/P EST MOD 30 MIN: CPT | Performed by: INTERNAL MEDICINE

## 2023-03-31 PROCEDURE — 3008F BODY MASS INDEX DOCD: CPT | Performed by: INTERNAL MEDICINE

## 2023-03-31 PROCEDURE — 3079F DIAST BP 80-89 MM HG: CPT | Performed by: INTERNAL MEDICINE

## 2023-03-31 PROCEDURE — 3074F SYST BP LT 130 MM HG: CPT | Performed by: INTERNAL MEDICINE

## 2023-03-31 RX ORDER — ALBUTEROL SULFATE 90 UG/1
2 AEROSOL, METERED RESPIRATORY (INHALATION)
COMMUNITY
Start: 2022-12-28

## 2023-03-31 RX ORDER — CLONAZEPAM 1 MG/1
1 TABLET ORAL NIGHTLY
COMMUNITY
Start: 2021-10-25

## 2023-03-31 RX ORDER — FAMOTIDINE 20 MG/1
TABLET, FILM COATED ORAL AS NEEDED
COMMUNITY
Start: 2021-07-07

## 2023-03-31 RX ORDER — DENOSUMAB 60 MG/ML
60 INJECTION SUBCUTANEOUS
COMMUNITY

## 2023-03-31 RX ORDER — DULOXETIN HYDROCHLORIDE 60 MG/1
60 CAPSULE, DELAYED RELEASE ORAL 2 TIMES DAILY
COMMUNITY

## 2023-03-31 RX ORDER — CALCIUM CARBONATE/VITAMIN D3 600 MG-10
1 TABLET ORAL DAILY
COMMUNITY

## 2023-03-31 RX ORDER — CLONAZEPAM 0.5 MG/1
0.5 TABLET ORAL EVERY MORNING
COMMUNITY
Start: 2021-10-25

## 2023-03-31 NOTE — PATIENT INSTRUCTIONS
-Plan:   -  - to get flutter valve   - plan to get CT chest in 3 months - get the disc -   - to get pfts prior to visit   - use rescue inhaler every 4 hours as needed    to get blood work in 3 months prior to visit   - see me after ct scan     Bhumi Trujillo MD  Pulmonary Medicine  03/31/23

## 2023-04-24 ENCOUNTER — PATIENT MESSAGE (OUTPATIENT)
Facility: CLINIC | Age: 63
End: 2023-04-24

## 2023-04-24 NOTE — TELEPHONE ENCOUNTER
From: Yael Bond  To: Loc Rob MD  Sent: 4/24/2023 11:49 AM CDT  Subject: Can you please forward my scheduled test to my local hospital     Morning ,   Currently I am having trouble w swollen lymph nodes under my left arm pit and on arm / w pain   My local dr has scheduled a CT w contrast of chest . To chest this out   Could you please send my other test to my local hospital so that I do not have to be running around . I believe it is blood test and a breathing test .   It is [facility]  I appreciate it very much. Thank you.  Also, can you please make sure it is approved by my insurance first for the breathing test  Thanks Maureen Bond   11/08/1960  [phone]

## 2023-05-08 ENCOUNTER — TELEPHONE (OUTPATIENT)
Facility: CLINIC | Age: 63
End: 2023-05-08

## 2023-05-08 NOTE — TELEPHONE ENCOUNTER
Pt had CT scan done at another facility 1 week ago and wants to know if office received them. Informed her that I dont see any documentation in her chart stating it was received but I will check at  and let her know.

## 2023-05-08 NOTE — TELEPHONE ENCOUNTER
Spoke with Nicki Glynn at  and the office has not received CT disc or anything else regarding patients recent CT scan from [facility]. Informed by Nicki Glynn that the typical procedure is that when the office receives the information we will call her or send her a [employer] message. Informed Channing Kyung of the information obtained from Nicki Glynn and she verbalized understanding. I suggested to patient to give the mail a little longer (it has only been 1 week) since her test and if she has not received a call or message from us then call the facility again where the test was performed. Pt verbalized understanding and had no further questions at this time.

## 2023-05-15 ENCOUNTER — MED REC SCAN ONLY (OUTPATIENT)
Facility: CLINIC | Age: 63
End: 2023-05-15

## 2023-06-06 ENCOUNTER — PATIENT MESSAGE (OUTPATIENT)
Facility: CLINIC | Age: 63
End: 2023-06-06

## 2023-06-07 ENCOUNTER — PATIENT MESSAGE (OUTPATIENT)
Facility: CLINIC | Age: 63
End: 2023-06-07

## 2023-06-07 NOTE — TELEPHONE ENCOUNTER
From: Manuel Bond  To: Dede Cockayne, MD  Sent: 6/6/2023 10:59 PM CDT  Subject: Results Chest CT May 1 2023 Disc     Attn Duc Rudolph   I requested the chest CT to be sent again and the written results sent also . Did you ever receive them . The hospital sent them attn Dr Saul Si Could they be on her desk ? ? If not tomorrow I go for my Breathing test which you sent in the request so there should be no problem w getting the results . I will also get another copy of the chest CT as back up   Do I need labs if so please send them for tomorrow and I will get them   Our hospital will be closing on the 16 of this month due to funding issues !    Thanks so much   Veronika Bond   [id number]   [address] [place] [address]

## 2023-06-07 NOTE — TELEPHONE ENCOUNTER
Unable to find CT report from 5-1-23 in EMR. Disc sent to be uploaded. Left message for pt to call to discuss. Pt will send another copy of CT scan for her appt on 6-20-23.   Lab orders faxed to [facility]. Lala's to [phone] per pt's request.

## 2023-06-08 NOTE — TELEPHONE ENCOUNTER
From: Juventino Bond  To:  Oscar Reyes MD  Sent: 6/7/2023 6:35 PM CDT  Subject: Results of May 2023 CT CHEST Results     Report of CT CHEST You already have disc

## 2023-06-15 ENCOUNTER — PATIENT MESSAGE (OUTPATIENT)
Facility: CLINIC | Age: 63
End: 2023-06-15

## 2023-06-16 ENCOUNTER — PATIENT MESSAGE (OUTPATIENT)
Facility: CLINIC | Age: 63
End: 2023-06-16

## 2023-06-16 NOTE — TELEPHONE ENCOUNTER
From: Valdemar Bond  To: Lavetta Bumpers, MD  Sent: 6/16/2023 3:33 PM CDT  Subject: Results of RH Factor     Results   I relooked and do not have the other 3 test you asked me about . Swati Christine

## 2023-06-16 NOTE — TELEPHONE ENCOUNTER
From: Merry Bond  To:  Tracee Ortiz MD  Sent: 6/15/2023 12:35 PM CDT  Subject: Results of pulmonary function test    Results of pulmonary function test included   [facility]   June 17 , 2023

## 2023-06-20 ENCOUNTER — TELEMEDICINE (OUTPATIENT)
Facility: CLINIC | Age: 63
End: 2023-06-20
Payer: COMMERCIAL

## 2023-06-20 DIAGNOSIS — R91.8 PULMONARY INFILTRATE: Primary | ICD-10-CM

## 2023-06-20 PROCEDURE — 99443 PHONE E/M BY PHYS 21-30 MIN: CPT | Performed by: INTERNAL MEDICINE

## 2023-06-20 RX ORDER — LEVOFLOXACIN 750 MG/1
750 TABLET ORAL DAILY
Qty: 10 TABLET | Refills: 0 | Status: SHIPPED | OUTPATIENT
Start: 2023-06-20

## 2023-07-07 ENCOUNTER — PATIENT MESSAGE (OUTPATIENT)
Facility: CLINIC | Age: 63
End: 2023-07-07

## 2023-08-24 ENCOUNTER — PATIENT MESSAGE (OUTPATIENT)
Facility: CLINIC | Age: 63
End: 2023-08-24

## 2023-08-24 DIAGNOSIS — R91.8 PULMONARY INFILTRATES: Primary | ICD-10-CM

## 2023-08-24 DIAGNOSIS — J18.9 PNEUMONIA DUE TO INFECTIOUS ORGANISM, UNSPECIFIED LATERALITY, UNSPECIFIED PART OF LUNG: ICD-10-CM

## 2023-08-24 NOTE — TELEPHONE ENCOUNTER
From: Brittany Bond  To:  Marck Frankel MD  Sent: 8/24/2023 1:11 PM CDT  Subject: Pneumonia again Attn Normanselene Sorenson I started not feeling well a few days ago   Bre Gaston to urgent care they did a X-ray   Thought I would send results to you   For updating my chart

## 2023-08-24 NOTE — TELEPHONE ENCOUNTER
Routed messsage to Dr Blanca Crowder for review. Printout given to Dr Blanca Crowder with test results and copy sent to scanning.

## 2023-08-25 NOTE — TELEPHONE ENCOUNTER
Per Dr. Camila Leigh, pt needs HRCT and follow up in 3 weeks. Pt will call to schedule appt with Dr. Camila Leigh after she gets appt for HRCT.

## 2023-08-28 ENCOUNTER — PATIENT MESSAGE (OUTPATIENT)
Facility: CLINIC | Age: 63
End: 2023-08-28

## 2023-08-28 ENCOUNTER — MED REC SCAN ONLY (OUTPATIENT)
Facility: CLINIC | Age: 63
End: 2023-08-28

## 2023-08-28 NOTE — TELEPHONE ENCOUNTER
Pt has HRCT scheduled on 9-11-23. Appt with Dr. Suze Starks scheduled for 10-11-23. She will call after HRCT and see if appt should be moved up.

## 2023-08-29 RX ORDER — CEFUROXIME AXETIL 500 MG/1
500 TABLET ORAL 2 TIMES DAILY
Qty: 20 TABLET | Refills: 0 | Status: SHIPPED | OUTPATIENT
Start: 2023-08-29

## 2023-09-11 ENCOUNTER — HOSPITAL ENCOUNTER (OUTPATIENT)
Dept: CT IMAGING | Age: 63
Discharge: HOME | End: 2023-09-11
Attending: INTERNAL MEDICINE
Payer: COMMERCIAL

## 2023-09-11 DIAGNOSIS — R91.8 PULMONARY INFILTRATES: ICD-10-CM

## 2023-09-11 DIAGNOSIS — J18.9 PNEUMONIA DUE TO INFECTIOUS ORGANISM, UNSPECIFIED LATERALITY, UNSPECIFIED PART OF LUNG: ICD-10-CM

## 2023-09-11 PROCEDURE — 71250 CT THORAX DX C-: CPT | Performed by: INTERNAL MEDICINE

## 2023-10-11 ENCOUNTER — OFFICE VISIT (OUTPATIENT)
Facility: CLINIC | Age: 63
End: 2023-10-11
Payer: COMMERCIAL

## 2023-10-11 VITALS
OXYGEN SATURATION: 100 % | BODY MASS INDEX: 23.16 KG/M2 | DIASTOLIC BLOOD PRESSURE: 70 MMHG | RESPIRATION RATE: 18 BRPM | WEIGHT: 139 LBS | SYSTOLIC BLOOD PRESSURE: 116 MMHG | HEIGHT: 65 IN | HEART RATE: 70 BPM

## 2023-10-11 DIAGNOSIS — J47.9 BRONCHIECTASIS WITHOUT COMPLICATION (HCC): Primary | ICD-10-CM

## 2023-10-11 PROCEDURE — 3078F DIAST BP <80 MM HG: CPT | Performed by: INTERNAL MEDICINE

## 2023-10-11 PROCEDURE — 99214 OFFICE O/P EST MOD 30 MIN: CPT | Performed by: INTERNAL MEDICINE

## 2023-10-11 PROCEDURE — 3008F BODY MASS INDEX DOCD: CPT | Performed by: INTERNAL MEDICINE

## 2023-10-11 PROCEDURE — 3074F SYST BP LT 130 MM HG: CPT | Performed by: INTERNAL MEDICINE

## 2023-10-11 NOTE — PATIENT INSTRUCTIONS
-Plan:   -  - to resume  flutter valve - 1-2 times per day   - use rescue inhaler every 4 hours as needed -   - to get pfts prior to next visit in 3-4 months   - vaccine - covid and flu and RSV   - to get sputum sample -     Christian Arteaga MD  Pulmonary Medicine  [phone]
Detail Level: Detailed
Detail Level: Simple

## 2023-11-03 ENCOUNTER — HOSPITAL ENCOUNTER (OUTPATIENT)
Dept: LAB | Facility: HOSPITAL | Age: 63
Discharge: HOME | End: 2023-11-03
Attending: PHYSICIAN ASSISTANT
Payer: COMMERCIAL

## 2023-11-03 LAB
ALBUMIN SERPL-MCNC: 4 G/DL (ref 3.4–5)
ALBUMIN/GLOB SERPL: 1 {RATIO} (ref 1–2)
ALP LIVER SERPL-CCNC: 51 U/L
ALT SERPL-CCNC: 27 U/L
ANION GAP SERPL CALC-SCNC: 5 MMOL/L (ref 0–18)
AST SERPL-CCNC: 18 U/L (ref 15–37)
BASOPHILS # BLD AUTO: 0.02 X10(3) UL (ref 0–0.2)
BASOPHILS NFR BLD AUTO: 0.4 %
BILIRUB SERPL-MCNC: 0.4 MG/DL (ref 0.1–2)
BUN BLD-MCNC: 12 MG/DL (ref 9–23)
CALCIUM BLD-MCNC: 9.4 MG/DL (ref 8.5–10.1)
CHLORIDE SERPL-SCNC: 109 MMOL/L (ref 98–112)
CHOLEST SERPL-MCNC: 277 MG/DL (ref ?–200)
CO2 SERPL-SCNC: 26 MMOL/L (ref 21–32)
CREAT BLD-MCNC: 1.09 MG/DL
EGFRCR SERPLBLD CKD-EPI 2021: 57 ML/MIN/1.73M2 (ref 60–?)
EOSINOPHIL # BLD AUTO: 0.11 X10(3) UL (ref 0–0.7)
EOSINOPHIL NFR BLD AUTO: 2.3 %
ERYTHROCYTE [DISTWIDTH] IN BLOOD BY AUTOMATED COUNT: 13.7 %
FASTING PATIENT LIPID ANSWER: YES
FASTING STATUS PATIENT QL REPORTED: YES
GLOBULIN PLAS-MCNC: 3.9 G/DL (ref 2.8–4.4)
GLUCOSE BLD-MCNC: 99 MG/DL (ref 70–99)
HCT VFR BLD AUTO: 41.6 %
HDLC SERPL-MCNC: 65 MG/DL (ref 40–59)
HGB BLD-MCNC: 13.8 G/DL
IMM GRANULOCYTES # BLD AUTO: 0.01 X10(3) UL (ref 0–1)
IMM GRANULOCYTES NFR BLD: 0.2 %
LDLC SERPL CALC-MCNC: 190 MG/DL (ref ?–100)
LYMPHOCYTES # BLD AUTO: 2.17 X10(3) UL (ref 1–4)
LYMPHOCYTES NFR BLD AUTO: 45.9 %
MCH RBC QN AUTO: 28.8 PG (ref 26–34)
MCHC RBC AUTO-ENTMCNC: 33.2 G/DL (ref 31–37)
MCV RBC AUTO: 86.7 FL
MONOCYTES # BLD AUTO: 0.33 X10(3) UL (ref 0.1–1)
MONOCYTES NFR BLD AUTO: 7 %
NEUTROPHILS # BLD AUTO: 2.09 X10 (3) UL (ref 1.5–7.7)
NEUTROPHILS # BLD AUTO: 2.09 X10(3) UL (ref 1.5–7.7)
NEUTROPHILS NFR BLD AUTO: 44.2 %
NONHDLC SERPL-MCNC: 212 MG/DL (ref ?–130)
OSMOLALITY SERPL CALC.SUM OF ELEC: 290 MOSM/KG (ref 275–295)
PLATELET # BLD AUTO: 323 10(3)UL (ref 150–450)
POTASSIUM SERPL-SCNC: 3.8 MMOL/L (ref 3.5–5.1)
PROT SERPL-MCNC: 7.9 G/DL (ref 6.4–8.2)
RBC # BLD AUTO: 4.8 X10(6)UL
SODIUM SERPL-SCNC: 140 MMOL/L (ref 136–145)
TRIGL SERPL-MCNC: 126 MG/DL (ref 30–149)
TSI SER-ACNC: 1.36 MIU/ML (ref 0.36–3.74)
VLDLC SERPL CALC-MCNC: 26 MG/DL (ref 0–30)
WBC # BLD AUTO: 4.7 X10(3) UL (ref 4–11)

## 2023-11-03 PROCEDURE — 80061 LIPID PANEL: CPT

## 2023-11-03 PROCEDURE — 36415 COLL VENOUS BLD VENIPUNCTURE: CPT

## 2023-11-03 PROCEDURE — 85025 COMPLETE CBC W/AUTO DIFF WBC: CPT

## 2023-11-03 PROCEDURE — 84443 ASSAY THYROID STIM HORMONE: CPT

## 2023-11-03 PROCEDURE — 80053 COMPREHEN METABOLIC PANEL: CPT

## 2023-11-10 ENCOUNTER — HOSPITAL ENCOUNTER (OUTPATIENT)
Dept: CV DIAGNOSTICS | Facility: HOSPITAL | Age: 63
Discharge: HOME | End: 2023-11-10
Attending: PHYSICIAN ASSISTANT
Payer: COMMERCIAL

## 2023-11-10 DIAGNOSIS — I34.0 NONRHEUMATIC MITRAL (VALVE) INSUFFICIENCY: ICD-10-CM

## 2023-11-10 DIAGNOSIS — Z86.73 HISTORY OF CVA (CEREBROVASCULAR ACCIDENT): ICD-10-CM

## 2023-11-10 DIAGNOSIS — R00.2 PALPITATIONS: ICD-10-CM

## 2023-11-10 DIAGNOSIS — I34.1 MVP (MITRAL VALVE PROLAPSE): ICD-10-CM

## 2023-11-10 PROCEDURE — 93306 TTE W/DOPPLER COMPLETE: CPT | Performed by: PHYSICIAN ASSISTANT

## 2024-01-03 ENCOUNTER — PATIENT MESSAGE (OUTPATIENT)
Facility: CLINIC | Age: 64
End: 2024-01-03

## 2024-01-03 NOTE — TELEPHONE ENCOUNTER
Call placed to pt and advised pt that the blood work ordered by [facility] is not the same labs ordered by Dr. Colbert.  Sputum culture ordered on 10-11-23 and pt states that she cannot produce sputum.  Also had other labs ordered on 3-31-23 that have not been completed.  Pt has appt with Dr. Colbert on 1-10-24 and she will advised to discuss with Dr. Colbert whether she should still have labs drawn and can have drawn after appt since she comes from a distance.  Pt verbalizes understanding.

## 2024-01-03 NOTE — TELEPHONE ENCOUNTER
From: Marimar Bond  To: Mari Colbert  Sent: 1/3/2024 10:10 AM CST  Subject: Blood work     I did not do my blood work that Dr Colbert ordered because the same blood work was ordered through the [facility] . You can find it on my edward chart of blood test Done in Nov 2023   Thanks   Marimar Bond

## 2024-01-10 ENCOUNTER — TELEMEDICINE (OUTPATIENT)
Facility: CLINIC | Age: 64
End: 2024-01-10
Payer: COMMERCIAL

## 2024-01-10 DIAGNOSIS — G47.33 OSA (OBSTRUCTIVE SLEEP APNEA): Primary | ICD-10-CM

## 2024-01-10 PROCEDURE — 99443 PHONE E/M BY PHYS 21-30 MIN: CPT | Performed by: INTERNAL MEDICINE

## 2024-01-10 NOTE — PROGRESS NOTES
Pulmonary Progress Note    History of Present Illness:  Marimar Bond is a 63 year old female presenting to pulmonary clinic --  Early December got flu then covid- fatigue and cough downhill- xmas was sick with covid-   To er- CT chest with pneumonia   zpack and ab for ear infection-- then better then sick - early march-- tired again and cough - cant breath - to bed- night sweats no fever known - aches and HA-   - AB- levaquin- - Wednesday to er and admitted wed to Saturday with fluids and 2 AB- - then augmentin -   Couldn't breath - shortness of breath and dizzy chest pressure front to back - thought indigestion component as well  Now better- still tired - some more energy - not back to normal -- gets winded- short of breath up the stairs - sat down -back to work  Chest pain is resolved- some achy better - --Notes rare sense of pulling left chest with very deep inspiration  No mucus- more coughing at night     30min phone visit   Trouble breathing last week - left chest- discomfort- deep breath it hurts  Same pain as prior   No fever   Now resolved - told to go to [facility]- - did not go - occasionally  Notes   Cruise to [place]- on Friday -   Feels well now- tired- - coughs when laying down-- no mucus   Night sweats- had resolved then recurred -   Increased sinus on Sunday - - with HA- now better   No contacts-  had URI one month ago-     10.23- feels well-   Using albuterol more- - vaccuuming- with spider webs etc- needed inhaler - shortness of breath - chest tightness - albuterol helps - gets tired and winded   Remains active with grandkids- - no real walking- - not limited - 2 story house -   Some coughing - not a lot- now with occasionally  mucus- - greenish-     No new visits with primary - [facility] - to come back -   Saw derm- no lesions-   No cp  Due to see cardio with neftali -- - in nov 1.24- doing well   No UC /er   Saw dr lindsay -- has moniter - some leak of Mitral valve and told ? Afib-- to see  dr lindsay to see feb 20ht   Cough is good - a little at most-   When feels getting overexerted - stops - and rests   Remains active-- walking 3-4 times per week 2-3 miles without limitation - gets tired           30min video  visit               Social History:   Social History     Socioeconomic History    Marital status:    Tobacco Use    Smoking status: Never    Smokeless tobacco: Never   Substance and Sexual Activity    Alcohol use: No    Drug use: No   Other Topics Concern    Caffeine Concern Yes     Comment: 1 soda daily    Exercise No     Comment: walking x 3-4 weekly         Medications:   Current Outpatient Medications   Medication Sig Dispense Refill    Multiple Vitamin (MULTIVITAMIN OR) multivitamin capsule   Take 1 capsule every day by oral route.      albuterol 108 (90 Base) MCG/ACT Inhalation Aero Soln Inhale 2 puffs into the lungs every 4 to 6 hours as needed.      Calcium Carb-Cholecalciferol 600-10 MG-MCG Oral Tab Take 1 tablet by mouth daily.      clonazePAM 1 MG Oral Tab Take 1 tablet (1 mg total) by mouth nightly.      clonazePAM 0.5 MG Oral Tab Take 1 tablet (0.5 mg total) by mouth every morning.      denosumab (PROLIA) 60 MG/ML Subcutaneous Solution Prefilled Syringe 1 mL (60 mg total) every 6 (six) months.      famotidine 20 MG Oral Tab as needed.      DULoxetine 60 MG Oral Cap DR Particles Take 1 capsule (60 mg total) by mouth 2 (two) times daily.      Pantoprazole Sodium (PROTONIX) 20 MG Oral Tab EC Take 1 tablet (20 mg total) by mouth every morning before breakfast.      Cholecalciferol (VITAMIN D) 1000 units Oral Tab Take 1,000 Units by mouth daily.      Vitamin B-12 1000 MCG Oral Tab Take 2.5 tablets (2,500 mcg total) by mouth daily.      Ascorbic Acid (VITAMIN C) 100 MG Oral Tab Take 2.5 tablets (250 mg total) by mouth daily.      folic acid 400 MCG Oral Tab Take 1 tablet (400 mcg total) by mouth daily.      duloxetine (CYMBALTA) 60 MG Oral Cap DR Particles Take 2 tab every  morning      topiramate (TOPAMAX) 200 MG Oral Tab Take 1 tablet (200 mg total) by mouth nightly. For migraines      Probiotic Product (PROBIOTIC OR) Take 1 tablet by mouth 2 (two) times daily.        MAGNESIUM OR Take 2 tabs daily      cycloSPORINE (RESTASIS) 0.05 % Ophthalmic Emulsion 1 drop 2 (two) times daily.      fluticasone (FLONASE) 50 MCG/ACT Nasal Suspension 2 sprays each nostril once daily.     Please do not dispense until patient calls to order. (Patient taking differently: 2 sprays by Each Nare route 2 (two) times daily as needed. 2 sprays each nostril once daily.     Please do not dispense until patient calls to order.) 1 Bottle 11       Review of Systems:    Losing -  Weight--had gained 30 pounds since leg injury and immobility since has lost there but thinks maintaining at this time-- no big drop -- weighs now 134- ? Down from 146   Started in May- then FARAZ left -- Lymph nodes swelling -- arms axillary- and groin little bumps   Eating better - no appetite postinfection but seems better no diarrhea  Denies gerd-- unless drinks too much -resolved - remains on protonix daily  remains on PPI and pepcid as needed   Decreased pop ongoing   No abd pain or issues  No swelling   Reports some sleeping issues -sleeping ? Better - not awakening now - and sleeping well   Gets hot during the day and at night-- flushed and then sweaty - some day and some at night -- hot at night - some sweating still - when awakenings   Leg cramps a lot - josie when sick - - some back issues no back MD             Physical Exam:  There were no vitals taken for this visit.   Looks comfortable without coughing     Results: reviewed     Assessment/Plan:  #Cough/dyspnea/bronchiectasis   Continues to deny  Remains on Flonase daily well-controlled  PFTs 2018 with mild air trapping no obstruction or restriction  12.22--describes coughing attacks in association with dust exposure and occasionally throughout the day--occasionally Produces  brownish mucus with change/hard bits--- suspected evolving bronchiectasis questionable SATNAM-to use Flonase and check sputum  3/23-never got flutter valve  Had improved until December with flu followed by significant COVID-cough fatigue dyspnea--then had improved--then early March with thought to be community-acquired pneumonia--left upper lobe dense pulmonary infiltrate parabronchial--required admission improved with antibiotics x2 now overall improved--to get PFTs  6.23 repeat PFTs essentially normal-compared to 2018 hyperinflation has decreased--repeat CT with significant improvement in the left upper lobe consolidation  Now with resurgence of left-sided chest pain-and night sweats-unclear patient unable unwilling to get follow-up scanning at this time as she has a cruise planned--plan for empiric Levaquin and follow  10/23 repeat illness with resolves with antibiotics  Left upper lobe and right lower lobe pulmonary infiltrate  Significant improvement now with increased left lower lobe minimal groundglass with plans to follow-question of COPD etc. low-grade--questionable component airways obstruction PFTs in June normal study--using albuterol--follow for need to accelerate inhalers versus methacholine  1.24- using albuterol 1-2 times per week - cough or sob- helps   If pushes too much - no wheezing noted   Rare flutter valve use - if sick     #Multiple granulomas  Right upper lobe mass new from CT 2009-had uptake 2.1-status post biopsy with inflammation fibrosis and nonnecrotizing granulomas--AFB and fungal were negative no adenopathy  Suspected prior fungal infection without active disease  Repeat CT 2/19 now smaller 1.2 cm-all cultures remain negative  11/19 with stable right lateral granulomas new right upper lobe groundglass opacity  Questionably related to mold in the basement with increased cough following exposure then cleaning  Negative HP panel negative ED  4/20 doing well without symptoms  5/21 new  cluster resolved  12/22-CT now with increased reticular nodule right lower lobe patchy as well as stable right middle lobe density.-Prior biopsy-Lateral lesions appear unchanged in size or density.  Suspected SATNAM/versus fungal  3/23--community-acquired pneumonia with left upper lobe pulmonary infiltrate  Right lateral granulomas about the same  No sputum received  Plan for short interval follow-up CT monitor symptoms-and follow for need for bronchoscopy repeat blood work pending  6.23- TATUM consolidation as above improved on CT--negative blood for hypersensitivity pneumonitis-now with resurgence of left anterior chest pain-and night sweats-to treat empirically as she is going on a cruise-  10/23 now improved overall but remains with night sweats at times denies chronic cough or dyspnea follow-up for need for biopsy--component bronchiectasis  1/24 CT last with improvement following clinically      #History of cardiomyopathy  At time of anorexia diagnosis had dilated cardiomyopathy now resolved  Known mitral valve prolapse  Abnormal Holter with heart rate to 160 told nothing wrong  Follows with Dr. Edwards seen yearly  Recent visit with no changes  3/23 noted to be very tachycardic during hospitalization--plans to see Dr. Edwards in follow-up  1/24 reports some concerns about mitral regurg with recent testing and plan for follow-up next month      #Osteoporosis  Remains on Prolia twice a year  1.24 no changes       #GERD  Thinks well controlled and adjusting diet  Remains on omeprazole 20 mg twice daily-occasionally adds Pepcid  Remote scopes  8/22 remains controlled on medication occasional breakthrough with diet  3/23 thought component of chest upper abdominal discomfort remains on full medication  6/23 controlled   1.24- some gerd not that bad- remains on ppi daily and occasionally  pepcid - - or tries albuterol as  needed         #Significant weight loss  Etiology unclear-plan to check blood work and  thyroid  Discussed at length  Thinks stable  6.23- reports stable now     # lymph node swelling ?  Reports hx in past though unclear -   Reports arms and legs - side of leg- ? Feels them- side ? Painful no skin changes    - saw primary and did blood tests- no dx  Now states there but less-   No med or hilar nodes on CT 5/23/23       # Sleep disorder suspected ILEANA  Reports very remote negative study in the past--now with fatigue awakenings night sweats with tachycardia  Agrees to home study      -Plan:  -  - to resume  flutter valve - 1-2 times per day - if you get sick   - continue use rescue inhaler every 4 hours as needed    - to get sleep study at home   - see me in 3 months       Mari Colbert MD  Pulmonary Medicine  1.10.24

## 2024-02-06 NOTE — IMAGING NOTE
Maintain hydration/increase fluids intake/use Vicks VapoRub/humidifier  Use over-the-counter Tylenol as needed for pain  Schedule follow-up visit with primary care provider in 1 to 2 days  You may return to immediate care center with worsening complaint or go to the nearest emergency room        Remember that examination and testing performed in the immediate care center, is not a comprehensive evaluation for all medical conditions and does not replace the need for follow-up with your primary care doctor. In the urgent care center, we are only able to evaluate your symptoms in the current condition, but symptoms may change or worsen.     Patient arrived with Daughter to CT for a right lobe lung biopsy. The procedure was explained to the patient and all her questions were answered. A 22g IV was placed in the patient's Right AC and the patient was brought to CT.   Dr. Ryanne Dhaliwal consented the pa

## 2024-06-26 ENCOUNTER — PATIENT MESSAGE (OUTPATIENT)
Facility: CLINIC | Age: 64
End: 2024-06-26

## 2024-07-25 ENCOUNTER — MED REC SCAN ONLY (OUTPATIENT)
Facility: CLINIC | Age: 64
End: 2024-07-25

## 2024-07-29 NOTE — TELEPHONE ENCOUNTER
Due to other medical problems, pt has not had her sleep study completed or have her 3 month follow up appointment as instructed by Dr. Colbert at her last appointment on 1-10-24.  Provided pt with phone number to sleep lab to schedule home sleep study and transferred to  to schedule follow up appointment with Dr. Colbert.  Appointment with Dr. Colbert scheduled for 8-21-24.

## 2024-07-29 NOTE — TELEPHONE ENCOUNTER
From: Marimar Bond  Sent: 7/28/2024 8:26 PM CDT  To: [facility] Pulmonary Clinical Staff  Subject: Does  want to see me or phone visit     I was just wondering if  wanted to see me since it’s been past the 3mths . I was under the assumption you were going to get back to me after Dr saw my results from the CT ? Please let me know if I should make a appointment ?   Thank you   May Melina Bond   Also you where going to find out if the sleep study could be done at my home since I live over a hr away .

## 2024-08-06 ENCOUNTER — TELEPHONE (OUTPATIENT)
Facility: CLINIC | Age: 64
End: 2024-08-06

## 2024-08-06 DIAGNOSIS — G47.33 OSA (OBSTRUCTIVE SLEEP APNEA): Primary | ICD-10-CM

## 2024-08-06 DIAGNOSIS — I42.9 CARDIOMYOPATHY, UNSPECIFIED TYPE (HCC): ICD-10-CM

## 2024-08-06 DIAGNOSIS — I20.0 UNSTABLE ANGINA (HCC): ICD-10-CM

## 2024-08-06 NOTE — TELEPHONE ENCOUNTER
Pt requesting for sleep study to be sent to [facility] since it's closer to her.  She wants order faxed to [facility] in [place] [phone] attn.  TORSTEN.   Informed pt order faxed to sched appt and f/up with Dr Colbert. Pt understood and agreed with plan.

## 2024-10-02 ENCOUNTER — TELEPHONE (OUTPATIENT)
Facility: CLINIC | Age: 64
End: 2024-10-02

## 2024-10-02 NOTE — TELEPHONE ENCOUNTER
Received fax from [facility] with patients home sleep study results from 09/23/24.  Results placed in Dr Crook's in basket to review.  Once Dr crook has reviewed, will send copy to scanning.

## 2024-10-25 ENCOUNTER — PATIENT MESSAGE (OUTPATIENT)
Facility: CLINIC | Age: 64
End: 2024-10-25

## 2024-10-25 DIAGNOSIS — R91.1 LUNG NODULE: Primary | ICD-10-CM

## 2024-10-25 NOTE — TELEPHONE ENCOUNTER
Patient sent My chart message questioning if she needs a follow up CT chest. Will check with Dr. Colbert and f/u with patient.   Consulted with Dr. Colbert.  Patient to have a High Resolution CT chest.  Bring disk and follow up post sleep study.

## 2024-11-07 ENCOUNTER — MED REC SCAN ONLY (OUTPATIENT)
Facility: CLINIC | Age: 64
End: 2024-11-07

## 2024-11-15 ENCOUNTER — TELEPHONE (OUTPATIENT)
Facility: CLINIC | Age: 64
End: 2024-11-15

## 2024-11-15 NOTE — TELEPHONE ENCOUNTER
Pt having CT scan at [facility]  [facility] in [facility] on Monday, 11-18-24.  Left message at [facility] that we were not aware that pt was scheduled for Monday.  Call placed to pt. Informed pt that I would forward the information to the authorization team and that I was unsure if the CT could get authorized by Monday.  Message forwarded to central auth team representative Delmis GREGORY to inform her that authorization is needed.  .

## 2024-12-20 ENCOUNTER — PATIENT MESSAGE (OUTPATIENT)
Facility: CLINIC | Age: 64
End: 2024-12-20

## 2024-12-20 DIAGNOSIS — I20.0 UNSTABLE ANGINA (HCC): ICD-10-CM

## 2024-12-20 DIAGNOSIS — I50.9 CONGESTIVE HEART FAILURE, UNSPECIFIED HF CHRONICITY, UNSPECIFIED HEART FAILURE TYPE (HCC): ICD-10-CM

## 2024-12-20 DIAGNOSIS — G47.33 OSA (OBSTRUCTIVE SLEEP APNEA): Primary | ICD-10-CM

## 2024-12-30 ENCOUNTER — PATIENT MESSAGE (OUTPATIENT)
Facility: CLINIC | Age: 64
End: 2024-12-30

## 2024-12-30 NOTE — TELEPHONE ENCOUNTER
Advised pt that sleep study was sent to [facility] on 12-20-24.  She will call them to see if she can schedule.  If they did not receive order, she will get their fax number to resend.

## 2025-01-21 ENCOUNTER — TELEMEDICINE (OUTPATIENT)
Facility: CLINIC | Age: 65
End: 2025-01-21
Payer: COMMERCIAL

## 2025-01-21 DIAGNOSIS — R91.8 PULMONARY INFILTRATES: ICD-10-CM

## 2025-01-21 DIAGNOSIS — J47.9 BRONCHIECTASIS WITHOUT COMPLICATION (HCC): Primary | ICD-10-CM

## 2025-01-21 DIAGNOSIS — J18.9 PNEUMONIA DUE TO INFECTIOUS ORGANISM, UNSPECIFIED LATERALITY, UNSPECIFIED PART OF LUNG: ICD-10-CM

## 2025-01-21 PROCEDURE — 98014 SYNCH AUDIO-ONLY EST MOD 30: CPT | Performed by: INTERNAL MEDICINE

## 2025-01-21 RX ORDER — GABAPENTIN 300 MG/1
300 CAPSULE ORAL 3 TIMES DAILY
Qty: 30 CAPSULE | Refills: 0 | Status: SHIPPED | OUTPATIENT
Start: 2025-01-21

## 2025-01-21 NOTE — PROGRESS NOTES
Pulmonary Progress Note    History of Present Illness:  Marimar Bond is a 64 year old female presenting to pulmonary clinic --  Early December got flu then covid- fatigue and cough downhill- xmas was sick with covid-   To er- CT chest with pneumonia   zpack and ab for ear infection-- then better then sick - early march-- tired again and cough - cant breath - to bed- night sweats no fever known - aches and HA-   - AB- levaquin- - Wednesday to er and admitted wed to Saturday with fluids and 2 AB- - then augmentin -   Couldn't breath - shortness of breath and dizzy chest pressure front to back - thought indigestion component as well  Now better- still tired - some more energy - not back to normal -- gets winded- short of breath up the stairs - sat down -back to work  Chest pain is resolved- some achy better - --Notes rare sense of pulling left chest with very deep inspiration  No mucus- more coughing at night     30min phone visit   Trouble breathing last week - left chest- discomfort- deep breath it hurts  Same pain as prior   No fever   Now resolved - told to go to [facility]- - did not go - occasionally  Notes   Cruise to [place]- on Friday -   Feels well now- tired- - coughs when laying down-- no mucus   Night sweats- had resolved then recurred -   Increased sinus on Sunday - - with HA- now better   No contacts-  had URI one month ago-     10.23- feels well-   Using albuterol more- - vaccuuming- with spider webs etc- needed inhaler - shortness of breath - chest tightness - albuterol helps - gets tired and winded   Remains active with grandkids- - no real walking- - not limited - 2 story house -   Some coughing - not a lot- now with occasionally  mucus- - greenish-     No new visits with primary - [facility] - to come back -   Saw derm- no lesions-   No cp  Due to see cardio with neftali -- - in nov 1.24- doing well   No UC /er   Saw dr lindsay -- has moniter - some leak of Mitral valve and told ? Afib-- to see  dr lindsay to see feb 20ht   Cough is good - a little at most-   When feels getting overexerted - stops - and rests   Remains active-- walking 3-4 times per week 2-3 miles without limitation - gets tired     1/25- doing well -respiratory wise-was sick around [place] as below  Has shingles - vagina and into buttock /rectum and but cheeks - - rashes - shingles - more on one side and goes down her legs - mainly right side - some down the left- -   - breathing is good mostly ok- using rescue   Had influenza right before xmas with the flu with sinus - slept a lot - with nose bleed - prolonged then to sinus   In PT for hip pain - can't sleep -and now significant increase in perineal and right greater than left nerve pain  Took gabapentin did not make her sleepy helped some  Overall breathing seems comfortable cough remains occasionally productive of green but she thinks more from her sinuses no specific dyspnea  Due to see Dr. Barbosa  30min video  visit               Social History:   Social History     Socioeconomic History    Marital status:    Tobacco Use    Smoking status: Never    Smokeless tobacco: Never   Substance and Sexual Activity    Alcohol use: No    Drug use: No   Other Topics Concern    Caffeine Concern Yes     Comment: 1 soda daily    Exercise No     Comment: walking x 3-4 weekly         Medications:   Current Outpatient Medications   Medication Sig Dispense Refill    Multiple Vitamin (MULTIVITAMIN OR) multivitamin capsule   Take 1 capsule every day by oral route.      albuterol 108 (90 Base) MCG/ACT Inhalation Aero Soln Inhale 2 puffs into the lungs every 4 to 6 hours as needed.      Calcium Carb-Cholecalciferol 600-10 MG-MCG Oral Tab Take 1 tablet by mouth daily.      clonazePAM 1 MG Oral Tab Take 1 tablet (1 mg total) by mouth nightly.      clonazePAM 0.5 MG Oral Tab Take 1 tablet (0.5 mg total) by mouth every morning.      denosumab (PROLIA) 60 MG/ML Subcutaneous Solution Prefilled Syringe 1  mL (60 mg total) every 6 (six) months.      famotidine 20 MG Oral Tab as needed.      DULoxetine 60 MG Oral Cap DR Particles Take 1 capsule (60 mg total) by mouth 2 (two) times daily.      Pantoprazole Sodium (PROTONIX) 20 MG Oral Tab EC Take 1 tablet (20 mg total) by mouth every morning before breakfast.      Cholecalciferol (VITAMIN D) 1000 units Oral Tab Take 1,000 Units by mouth daily.      Vitamin B-12 1000 MCG Oral Tab Take 2.5 tablets (2,500 mcg total) by mouth daily.      Ascorbic Acid (VITAMIN C) 100 MG Oral Tab Take 2.5 tablets (250 mg total) by mouth daily.      folic acid 400 MCG Oral Tab Take 1 tablet (400 mcg total) by mouth daily.      duloxetine (CYMBALTA) 60 MG Oral Cap DR Particles Take 2 tab every morning      topiramate (TOPAMAX) 200 MG Oral Tab Take 1 tablet (200 mg total) by mouth nightly. For migraines      Probiotic Product (PROBIOTIC OR) Take 1 tablet by mouth 2 (two) times daily.        MAGNESIUM OR Take 2 tabs daily      cycloSPORINE (RESTASIS) 0.05 % Ophthalmic Emulsion 1 drop 2 (two) times daily.      fluticasone (FLONASE) 50 MCG/ACT Nasal Suspension 2 sprays each nostril once daily.     Please do not dispense until patient calls to order. (Patient taking differently: 2 sprays by Each Nare route 2 (two) times daily as needed. 2 sprays each nostril once daily.     Please do not dispense until patient calls to order.) 1 Bottle 11       Review of Systems:    Losing -  Weight--had gained 30 pounds since leg injury and immobility since has lost there but thinks maintaining at this time-- no big drop -- weighs now 134- ? Down from 146 -remains with she believes stable weight  Eating better - no appetite postinfection but seems better no diarrhea  Denies gerd-- unless drinks too much -resolved - remains on protonix daily  remains on PPI --no longer needs Pepcid  Decreased pop ongoing   No abd pain or issues  No swelling   Reports some sleeping issues -sleeping ? Better - not awakening now - and  sleeping well   Gets hot during the day and at night-- flushed and then sweaty - some day and some at night -- hot at night - some sweating still - when awakenings   Leg cramps a lot - josie when sick - - some back issues no back MD   Now with shingles pain            Physical Exam:  There were no vitals taken for this visit.   Looks comfortable without coughing     Results: reviewed     Assessment/Plan:  #Cough/dyspnea/bronchiectasis   Continues to deny  Remains on Flonase daily well-controlled  PFTs 2018 with mild air trapping no obstruction or restriction  12.22--describes coughing attacks in association with dust exposure and occasionally throughout the day--occasionally Produces brownish mucus with change/hard bits--- suspected evolving bronchiectasis questionable SATNAM-to use Flonase and check sputum  3/23-never got flutter valve  Had improved until December with flu followed by significant COVID-cough fatigue dyspnea--then had improved--then early March with thought to be community-acquired pneumonia--left upper lobe dense pulmonary infiltrate parabronchial--required admission improved with antibiotics x2 now overall improved--to get PFTs  6.23 repeat PFTs essentially normal-compared to 2018 hyperinflation has decreased--repeat CT with significant improvement in the left upper lobe consolidation  Now with resurgence of left-sided chest pain-and night sweats-unclear patient unable unwilling to get follow-up scanning at this time as she has a cruise planned--plan for empiric Levaquin and follow  10/23 repeat illness with resolves with antibiotics  Left upper lobe and right lower lobe pulmonary infiltrate  Significant improvement now with increased left lower lobe minimal groundglass with plans to follow-question of COPD etc. low-grade--questionable component airways obstruction PFTs in June normal study--using albuterol--follow for need to accelerate inhalers versus methacholine  1.24- using albuterol 1-2 times per  week - cough or sob- helps   If pushes too much - no wheezing noted   Rare flutter valve use - if sick   1/25- using albuterol daily with th flu and in the cold/sinus infection - now less  Coughing up mucus from sinus green -   Stopped flutter when sick - discussed - to resume -CT scan in November with stable to improved nodular bronchiectasis-improved from last-done prior to recent flare-plan for yearly    #Multiple granulomas  Right upper lobe mass new from CT 2009-had uptake 2.1-status post biopsy with inflammation fibrosis and nonnecrotizing granulomas--AFB and fungal were negative no adenopathy  Suspected prior fungal infection without active disease  Repeat CT 2/19 now smaller 1.2 cm-all cultures remain negative  11/19 with stable right lateral granulomas new right upper lobe groundglass opacity  Questionably related to mold in the basement with increased cough following exposure then cleaning  Negative HP panel negative ED  4/20 doing well without symptoms  5/21 new cluster resolved  12/22-CT now with increased reticular nodule right lower lobe patchy as well as stable right middle lobe density.-Prior biopsy-Lateral lesions appear unchanged in size or density.  Suspected SATNAM/versus fungal  3/23--community-acquired pneumonia with left upper lobe pulmonary infiltrate  Right lateral granulomas about the same  No sputum received  Plan for short interval follow-up CT monitor symptoms-and follow for need for bronchoscopy repeat blood work pending  6.23- TATUM consolidation as above improved on CT--negative blood for hypersensitivity pneumonitis-now with resurgence of left anterior chest pain-and night sweats-to treat empirically as she is going on a cruise-  10/23 now improved overall but remains with night sweats at times denies chronic cough or dyspnea follow-up for need for biopsy--component bronchiectasis  1/24 CT last with improvement following clinically  1/25-CT in November by report with improved nodules and  bronchiectasis-to send me the disc-recommended recurrent use of flutter      #History of cardiomyopathy  At time of anorexia diagnosis had dilated cardiomyopathy now resolved  Known mitral valve prolapse  Abnormal Holter with heart rate to 160 told nothing wrong  Follows with Dr. Edwards seen yearly  Recent visit with no changes  3/23 noted to be very tachycardic during hospitalization--plans to see Dr. Edwards in follow-up  1/24 reports some concerns about mitral regurg with recent testing and plan for follow-up next month  1/25- no changes - started on statin - dr edwards       #Osteoporosis  Remains on Prolia twice a year  1.24 no changes       #GERD  Thinks well controlled and adjusting diet  Remains on omeprazole 20 mg twice daily-occasionally adds Pepcid  Remote scopes  8/22 remains controlled on medication occasional breakthrough with diet  3/23 thought component of chest upper abdominal discomfort remains on full medication  6/23 controlled   1.24- some gerd not that bad- remains on ppi daily and occasionally  pepcid - - or tries albuterol as  needed   1/25- no pepcid with ppi - now only protonix - unsure - daily - recommended scopes - takes one yr to get scopes- to go to [place] -         #Significant weight loss  Etiology unclear-plan to check blood work and thyroid  Discussed at length  Thinks stable  6.23- reports stable now   1/25- the same - lost with illness     # lymph node swelling ?  Reports hx in past though unclear -   Reports arms and legs - side of leg- ? Feels them- side ? Painful no skin changes    - saw primary and did blood tests- no dx  Now states there but less-   No med or hilar nodes on CT 5/23/23       # Sleep disorder suspected ILEANA  Reports very remote negative study in the past--now with fatigue awakenings night sweats with tachycardia  Agrees to home study  1/25 had home study was up for study recommended in lab then went for an in lab study [place]-no significant desaturation no  apneas or hypopneas never got to REM sleep slept 282 minutes-recommended repeat study-discussed at length plan to hold until shingles pain has resolved      -Plan:  -  - to resume  flutter valve - 1-2 times per day - especially when sick  at least every 2-4 hours at night  - continue use rescue inhaler every 4 hours as needed    - mail disc please   - hold on repeat sleep study for now   - see me in 3-4  months - see when seeing dr neftali Colbert MD  Pulmonary Medicine  1/21/25

## 2025-04-28 ENCOUNTER — MED REC SCAN ONLY (OUTPATIENT)
Facility: CLINIC | Age: 65
End: 2025-04-28

## (undated) NOTE — ED AVS SNAPSHOT
Esvin Maurer   MRN: [id number]    Department:  [facility] Emergency Department   Date of Visit:  2/10/2020           Disclosure     Insurance plans vary and the physician(s) referred by the ER may not be covered by your plan.  Please contact your i tell this physician (or your personal doctor if your instructions are to return to your personal doctor) about any new or lasting problems. The primary care or specialist physician will see patients referred from the [facility] Emergency Department.  Cheryle Levins